# Patient Record
Sex: FEMALE | Race: WHITE | NOT HISPANIC OR LATINO | URBAN - METROPOLITAN AREA
[De-identification: names, ages, dates, MRNs, and addresses within clinical notes are randomized per-mention and may not be internally consistent; named-entity substitution may affect disease eponyms.]

---

## 2019-05-28 ENCOUNTER — HOSPITAL ENCOUNTER (OUTPATIENT)
Facility: HOSPITAL | Age: 24
Setting detail: HOSPITAL OUTPATIENT SURGERY
End: 2019-05-28
Attending: OBSTETRICS & GYNECOLOGY | Admitting: OBSTETRICS & GYNECOLOGY

## 2019-05-29 ENCOUNTER — HOSPITAL ENCOUNTER (OUTPATIENT)
Facility: HOSPITAL | Age: 24
Setting detail: HOSPITAL OUTPATIENT SURGERY
Discharge: HOME | End: 2019-05-29
Attending: OBSTETRICS & GYNECOLOGY | Admitting: OBSTETRICS & GYNECOLOGY

## 2019-05-29 ENCOUNTER — ANESTHESIA (OUTPATIENT)
Dept: PERIOP | Facility: HOSPITAL | Age: 24
End: 2019-05-29

## 2019-05-29 ENCOUNTER — ANESTHESIA EVENT (OUTPATIENT)
Dept: PERIOP | Facility: HOSPITAL | Age: 24
End: 2019-05-29

## 2019-05-29 VITALS
OXYGEN SATURATION: 96 % | SYSTOLIC BLOOD PRESSURE: 122 MMHG | DIASTOLIC BLOOD PRESSURE: 77 MMHG | HEART RATE: 90 BPM | RESPIRATION RATE: 16 BRPM | TEMPERATURE: 97.8 F

## 2019-05-29 DIAGNOSIS — N75.0 BARTHOLIN CYST: ICD-10-CM

## 2019-05-29 PROCEDURE — 25010000002 MIDAZOLAM PER 1 MG: Performed by: ANESTHESIOLOGY

## 2019-05-29 PROCEDURE — 25010000002 ONDANSETRON PER 1 MG: Performed by: NURSE ANESTHETIST, CERTIFIED REGISTERED

## 2019-05-29 PROCEDURE — 25010000002 FENTANYL CITRATE (PF) 100 MCG/2ML SOLUTION: Performed by: NURSE ANESTHETIST, CERTIFIED REGISTERED

## 2019-05-29 PROCEDURE — 25010000002 MIDAZOLAM PER 1 MG: Performed by: NURSE ANESTHETIST, CERTIFIED REGISTERED

## 2019-05-29 PROCEDURE — 25010000002 ROPIVACAINE PER 1 MG: Performed by: OBSTETRICS & GYNECOLOGY

## 2019-05-29 PROCEDURE — 25010000002 PROPOFOL 10 MG/ML EMULSION: Performed by: NURSE ANESTHETIST, CERTIFIED REGISTERED

## 2019-05-29 PROCEDURE — 88304 TISSUE EXAM BY PATHOLOGIST: CPT | Performed by: OBSTETRICS & GYNECOLOGY

## 2019-05-29 PROCEDURE — 25010000002 FENTANYL CITRATE (PF) 100 MCG/2ML SOLUTION: Performed by: ANESTHESIOLOGY

## 2019-05-29 PROCEDURE — 25010000002 DEXAMETHASONE PER 1 MG: Performed by: NURSE ANESTHETIST, CERTIFIED REGISTERED

## 2019-05-29 PROCEDURE — 25010000002 KETOROLAC TROMETHAMINE PER 15 MG: Performed by: NURSE ANESTHETIST, CERTIFIED REGISTERED

## 2019-05-29 RX ORDER — PROMETHAZINE HYDROCHLORIDE 25 MG/ML
6.25 INJECTION, SOLUTION INTRAMUSCULAR; INTRAVENOUS
Status: DISCONTINUED | OUTPATIENT
Start: 2019-05-29 | End: 2019-05-29 | Stop reason: HOSPADM

## 2019-05-29 RX ORDER — HYDRALAZINE HYDROCHLORIDE 20 MG/ML
5 INJECTION INTRAMUSCULAR; INTRAVENOUS
Status: DISCONTINUED | OUTPATIENT
Start: 2019-05-29 | End: 2019-05-29 | Stop reason: HOSPADM

## 2019-05-29 RX ORDER — EPHEDRINE SULFATE 50 MG/ML
5 INJECTION, SOLUTION INTRAVENOUS ONCE AS NEEDED
Status: DISCONTINUED | OUTPATIENT
Start: 2019-05-29 | End: 2019-05-29 | Stop reason: HOSPADM

## 2019-05-29 RX ORDER — LIDOCAINE HYDROCHLORIDE 20 MG/ML
INJECTION, SOLUTION INFILTRATION; PERINEURAL AS NEEDED
Status: DISCONTINUED | OUTPATIENT
Start: 2019-05-29 | End: 2019-05-29 | Stop reason: SURG

## 2019-05-29 RX ORDER — HYDROMORPHONE HYDROCHLORIDE 1 MG/ML
0.5 INJECTION, SOLUTION INTRAMUSCULAR; INTRAVENOUS; SUBCUTANEOUS
Status: DISCONTINUED | OUTPATIENT
Start: 2019-05-29 | End: 2019-05-29 | Stop reason: HOSPADM

## 2019-05-29 RX ORDER — MIDAZOLAM HYDROCHLORIDE 1 MG/ML
1 INJECTION INTRAMUSCULAR; INTRAVENOUS
Status: DISCONTINUED | OUTPATIENT
Start: 2019-05-29 | End: 2019-05-29 | Stop reason: HOSPADM

## 2019-05-29 RX ORDER — DIPHENHYDRAMINE HCL 25 MG
25 CAPSULE ORAL
Status: DISCONTINUED | OUTPATIENT
Start: 2019-05-29 | End: 2019-05-29 | Stop reason: HOSPADM

## 2019-05-29 RX ORDER — FENTANYL CITRATE 50 UG/ML
50 INJECTION, SOLUTION INTRAMUSCULAR; INTRAVENOUS
Status: DISCONTINUED | OUTPATIENT
Start: 2019-05-29 | End: 2019-05-29 | Stop reason: HOSPADM

## 2019-05-29 RX ORDER — LABETALOL HYDROCHLORIDE 5 MG/ML
5 INJECTION, SOLUTION INTRAVENOUS
Status: DISCONTINUED | OUTPATIENT
Start: 2019-05-29 | End: 2019-05-29 | Stop reason: HOSPADM

## 2019-05-29 RX ORDER — MIDAZOLAM HYDROCHLORIDE 1 MG/ML
2 INJECTION INTRAMUSCULAR; INTRAVENOUS
Status: DISCONTINUED | OUTPATIENT
Start: 2019-05-29 | End: 2019-05-29 | Stop reason: HOSPADM

## 2019-05-29 RX ORDER — CEPHALEXIN 500 MG/1
500 CAPSULE ORAL 3 TIMES DAILY
COMMUNITY

## 2019-05-29 RX ORDER — FAMOTIDINE 10 MG/ML
20 INJECTION, SOLUTION INTRAVENOUS ONCE
Status: COMPLETED | OUTPATIENT
Start: 2019-05-29 | End: 2019-05-29

## 2019-05-29 RX ORDER — HYDROCODONE BITARTRATE AND ACETAMINOPHEN 7.5; 325 MG/1; MG/1
1 TABLET ORAL ONCE AS NEEDED
Status: DISCONTINUED | OUTPATIENT
Start: 2019-05-29 | End: 2019-05-29 | Stop reason: HOSPADM

## 2019-05-29 RX ORDER — DEXAMETHASONE SODIUM PHOSPHATE 10 MG/ML
INJECTION INTRAMUSCULAR; INTRAVENOUS AS NEEDED
Status: DISCONTINUED | OUTPATIENT
Start: 2019-05-29 | End: 2019-05-29 | Stop reason: SURG

## 2019-05-29 RX ORDER — OXYCODONE AND ACETAMINOPHEN 7.5; 325 MG/1; MG/1
1 TABLET ORAL ONCE AS NEEDED
Status: COMPLETED | OUTPATIENT
Start: 2019-05-29 | End: 2019-05-29

## 2019-05-29 RX ORDER — NALOXONE HCL 0.4 MG/ML
0.2 VIAL (ML) INJECTION AS NEEDED
Status: DISCONTINUED | OUTPATIENT
Start: 2019-05-29 | End: 2019-05-29 | Stop reason: HOSPADM

## 2019-05-29 RX ORDER — PROMETHAZINE HYDROCHLORIDE 25 MG/ML
12.5 INJECTION, SOLUTION INTRAMUSCULAR; INTRAVENOUS ONCE AS NEEDED
Status: DISCONTINUED | OUTPATIENT
Start: 2019-05-29 | End: 2019-05-29 | Stop reason: HOSPADM

## 2019-05-29 RX ORDER — LIDOCAINE HYDROCHLORIDE 10 MG/ML
0.5 INJECTION, SOLUTION EPIDURAL; INFILTRATION; INTRACAUDAL; PERINEURAL ONCE AS NEEDED
Status: DISCONTINUED | OUTPATIENT
Start: 2019-05-29 | End: 2019-05-29 | Stop reason: HOSPADM

## 2019-05-29 RX ORDER — FENTANYL CITRATE 50 UG/ML
INJECTION, SOLUTION INTRAMUSCULAR; INTRAVENOUS AS NEEDED
Status: DISCONTINUED | OUTPATIENT
Start: 2019-05-29 | End: 2019-05-29 | Stop reason: SURG

## 2019-05-29 RX ORDER — SODIUM CHLORIDE, SODIUM LACTATE, POTASSIUM CHLORIDE, CALCIUM CHLORIDE 600; 310; 30; 20 MG/100ML; MG/100ML; MG/100ML; MG/100ML
9 INJECTION, SOLUTION INTRAVENOUS CONTINUOUS
Status: DISCONTINUED | OUTPATIENT
Start: 2019-05-29 | End: 2019-05-29 | Stop reason: HOSPADM

## 2019-05-29 RX ORDER — PROMETHAZINE HYDROCHLORIDE 25 MG/1
25 SUPPOSITORY RECTAL ONCE AS NEEDED
Status: DISCONTINUED | OUTPATIENT
Start: 2019-05-29 | End: 2019-05-29 | Stop reason: HOSPADM

## 2019-05-29 RX ORDER — MIDAZOLAM HYDROCHLORIDE 1 MG/ML
INJECTION INTRAMUSCULAR; INTRAVENOUS AS NEEDED
Status: DISCONTINUED | OUTPATIENT
Start: 2019-05-29 | End: 2019-05-29 | Stop reason: SURG

## 2019-05-29 RX ORDER — DIPHENHYDRAMINE HYDROCHLORIDE 50 MG/ML
12.5 INJECTION INTRAMUSCULAR; INTRAVENOUS
Status: DISCONTINUED | OUTPATIENT
Start: 2019-05-29 | End: 2019-05-29 | Stop reason: HOSPADM

## 2019-05-29 RX ORDER — FLUMAZENIL 0.1 MG/ML
0.2 INJECTION INTRAVENOUS AS NEEDED
Status: DISCONTINUED | OUTPATIENT
Start: 2019-05-29 | End: 2019-05-29 | Stop reason: HOSPADM

## 2019-05-29 RX ORDER — ONDANSETRON 2 MG/ML
INJECTION INTRAMUSCULAR; INTRAVENOUS AS NEEDED
Status: DISCONTINUED | OUTPATIENT
Start: 2019-05-29 | End: 2019-05-29 | Stop reason: SURG

## 2019-05-29 RX ORDER — PROPOFOL 10 MG/ML
VIAL (ML) INTRAVENOUS AS NEEDED
Status: DISCONTINUED | OUTPATIENT
Start: 2019-05-29 | End: 2019-05-29 | Stop reason: SURG

## 2019-05-29 RX ORDER — MAGNESIUM HYDROXIDE 1200 MG/15ML
LIQUID ORAL AS NEEDED
Status: DISCONTINUED | OUTPATIENT
Start: 2019-05-29 | End: 2019-05-29 | Stop reason: HOSPADM

## 2019-05-29 RX ORDER — ONDANSETRON 2 MG/ML
4 INJECTION INTRAMUSCULAR; INTRAVENOUS ONCE AS NEEDED
Status: DISCONTINUED | OUTPATIENT
Start: 2019-05-29 | End: 2019-05-29 | Stop reason: HOSPADM

## 2019-05-29 RX ORDER — KETOROLAC TROMETHAMINE 30 MG/ML
INJECTION, SOLUTION INTRAMUSCULAR; INTRAVENOUS AS NEEDED
Status: DISCONTINUED | OUTPATIENT
Start: 2019-05-29 | End: 2019-05-29 | Stop reason: SURG

## 2019-05-29 RX ORDER — SODIUM CHLORIDE 0.9 % (FLUSH) 0.9 %
1-10 SYRINGE (ML) INJECTION AS NEEDED
Status: DISCONTINUED | OUTPATIENT
Start: 2019-05-29 | End: 2019-05-29 | Stop reason: HOSPADM

## 2019-05-29 RX ORDER — ACETAMINOPHEN 325 MG/1
650 TABLET ORAL ONCE AS NEEDED
Status: DISCONTINUED | OUTPATIENT
Start: 2019-05-29 | End: 2019-05-29 | Stop reason: HOSPADM

## 2019-05-29 RX ORDER — PROMETHAZINE HYDROCHLORIDE 25 MG/1
25 TABLET ORAL ONCE AS NEEDED
Status: DISCONTINUED | OUTPATIENT
Start: 2019-05-29 | End: 2019-05-29 | Stop reason: HOSPADM

## 2019-05-29 RX ADMIN — KETOROLAC TROMETHAMINE 30 MG: 30 INJECTION, SOLUTION INTRAMUSCULAR; INTRAVENOUS at 17:20

## 2019-05-29 RX ADMIN — ONDANSETRON 4 MG: 2 INJECTION INTRAMUSCULAR; INTRAVENOUS at 17:16

## 2019-05-29 RX ADMIN — OXYCODONE HYDROCHLORIDE AND ACETAMINOPHEN 1 TABLET: 7.5; 325 TABLET ORAL at 18:27

## 2019-05-29 RX ADMIN — SODIUM CHLORIDE, POTASSIUM CHLORIDE, SODIUM LACTATE AND CALCIUM CHLORIDE: 600; 310; 30; 20 INJECTION, SOLUTION INTRAVENOUS at 17:04

## 2019-05-29 RX ADMIN — Medication 2 MG: at 17:05

## 2019-05-29 RX ADMIN — DEXAMETHASONE SODIUM PHOSPHATE 8 MG: 10 INJECTION INTRAMUSCULAR; INTRAVENOUS at 17:16

## 2019-05-29 RX ADMIN — FAMOTIDINE 20 MG: 10 INJECTION INTRAVENOUS at 16:58

## 2019-05-29 RX ADMIN — PROPOFOL 150 MG: 10 INJECTION, EMULSION INTRAVENOUS at 17:10

## 2019-05-29 RX ADMIN — FENTANYL CITRATE 50 MCG: 50 INJECTION INTRAMUSCULAR; INTRAVENOUS at 17:18

## 2019-05-29 RX ADMIN — FENTANYL CITRATE 50 MCG: 50 INJECTION INTRAMUSCULAR; INTRAVENOUS at 16:58

## 2019-05-29 RX ADMIN — FENTANYL CITRATE 50 MCG: 50 INJECTION INTRAMUSCULAR; INTRAVENOUS at 17:33

## 2019-05-29 RX ADMIN — FENTANYL CITRATE 100 MCG: 50 INJECTION INTRAMUSCULAR; INTRAVENOUS at 17:08

## 2019-05-29 RX ADMIN — SODIUM CHLORIDE, POTASSIUM CHLORIDE, SODIUM LACTATE AND CALCIUM CHLORIDE 9 ML/HR: 600; 310; 30; 20 INJECTION, SOLUTION INTRAVENOUS at 17:00

## 2019-05-29 RX ADMIN — MIDAZOLAM 1 MG: 1 INJECTION INTRAMUSCULAR; INTRAVENOUS at 16:59

## 2019-05-29 RX ADMIN — LIDOCAINE HYDROCHLORIDE 100 MG: 20 INJECTION, SOLUTION INFILTRATION; PERINEURAL at 17:10

## 2019-05-29 NOTE — ANESTHESIA PREPROCEDURE EVALUATION
Anesthesia Evaluation     Patient summary reviewed and Nursing notes reviewed   no history of anesthetic complications:  NPO Solid Status: > 8 hours  NPO Liquid Status: > 2 hours           Airway   Mallampati: I  TM distance: >3 FB  Neck ROM: full  no difficulty expected  Dental - normal exam     Pulmonary - normal exam   (+) a smoker Current Smoked day of surgery,   (-) COPD, asthma, shortness of breath, lung cancer  Cardiovascular - negative cardio ROS and normal exam  Exercise tolerance: excellent (>7 METS)    Rhythm: regular  Rate: normal    (-) hypertension, valvular problems/murmurs, past MI, CAD, dysrhythmias, angina, CHF, cardiac stents      Neuro/Psych- negative ROS  (-) seizures, TIA, CVA  GI/Hepatic/Renal/Endo - negative ROS   (-) hiatal hernia, GERD, PUD, hepatitis, liver disease, no renal disease, diabetes, hypothyroidism, GI bleed    Musculoskeletal (-) negative ROS    Abdominal  - normal exam   Substance History - negative use     OB/GYN negative ob/gyn ROS         Other - negative ROS                     Anesthesia Plan    ASA 1     general     intravenous induction   Anesthetic plan, all risks, benefits, and alternatives have been provided, discussed and informed consent has been obtained with: patient.    Plan discussed with CRNA and attending.

## 2019-05-29 NOTE — ANESTHESIA POSTPROCEDURE EVALUATION
Patient: Megha Alcantara    Procedure Summary     Date:  05/29/19 Room / Location:  [facility] OR  / [facility] OR    Anesthesia Start:  1704 Anesthesia Stop:  1748    Procedure:  BARTHOLIN CYST MARSUPIALIZATION (N/A Vulva) Diagnosis:      Surgeon:  Edwige Mace MD Provider:  Huang Molina MD    Anesthesia Type:  general ASA Status:  1          Anesthesia Type: general  Last vitals  BP   123/78 (05/29/19 1845)   Temp   36.6 °C (97.8 °F) (05/29/19 1745)   Pulse   82 (05/29/19 1845)   Resp   16 (05/29/19 1845)     SpO2   95 % (05/29/19 1845)     Post Anesthesia Care and Evaluation    Patient location during evaluation: PHASE II  Patient participation: complete - patient participated  Level of consciousness: awake and alert  Pain score: 0  Pain management: adequate  Airway patency: patent  Anesthetic complications: No anesthetic complications    Cardiovascular status: acceptable  Respiratory status: acceptable  Hydration status: acceptable    Comments: /78   Pulse 82   Temp 36.6 °C (97.8 °F) (Oral)   Resp 16   SpO2 95%

## 2019-05-31 LAB
CYTO UR: NORMAL
LAB AP CASE REPORT: NORMAL
PATH REPORT.FINAL DX SPEC: NORMAL
PATH REPORT.GROSS SPEC: NORMAL

## 2023-12-21 ENCOUNTER — HOSPITAL ENCOUNTER (OUTPATIENT)
Facility: HOSPITAL | Age: 28
Discharge: HOME | End: 2023-12-21
Attending: EMERGENCY MEDICINE
Payer: COMMERCIAL

## 2023-12-21 VITALS
BODY MASS INDEX: 22.15 KG/M2 | HEIGHT: 63 IN | OXYGEN SATURATION: 98 % | WEIGHT: 125 LBS | RESPIRATION RATE: 16 BRPM | TEMPERATURE: 98.5 F | DIASTOLIC BLOOD PRESSURE: 101 MMHG | HEART RATE: 80 BPM | SYSTOLIC BLOOD PRESSURE: 149 MMHG

## 2023-12-21 DIAGNOSIS — H66.90 ACUTE OTITIS MEDIA, UNSPECIFIED OTITIS MEDIA TYPE: Primary | ICD-10-CM

## 2023-12-21 PROCEDURE — 99203 OFFICE O/P NEW LOW 30 MIN: CPT | Performed by: PHYSICIAN ASSISTANT

## 2023-12-21 PROCEDURE — EDLOS: Performed by: PHYSICIAN ASSISTANT

## 2023-12-21 PROCEDURE — G0463 HOSPITAL OUTPT CLINIC VISIT: HCPCS | Performed by: PHYSICIAN ASSISTANT

## 2023-12-21 RX ORDER — AMOXICILLIN AND CLAVULANATE POTASSIUM 875; 125 MG/1; MG/1
1 TABLET, FILM COATED ORAL 2 TIMES DAILY
Qty: 20 TABLET | Refills: 0 | Status: SHIPPED | OUTPATIENT
Start: 2023-12-21 | End: 2023-12-31

## 2023-12-21 RX ORDER — AMOXICILLIN AND CLAVULANATE POTASSIUM 875; 125 MG/1; MG/1
1 TABLET, FILM COATED ORAL ONCE
Status: COMPLETED | OUTPATIENT
Start: 2023-12-21 | End: 2023-12-21

## 2023-12-21 RX ORDER — FLUTICASONE PROPIONATE 50 MCG
2 SPRAY, SUSPENSION (ML) NASAL DAILY
Qty: 9.9 ML | Refills: 0 | Status: SHIPPED | OUTPATIENT
Start: 2023-12-21

## 2023-12-21 RX ADMIN — AMOXICILLIN AND CLAVULANATE POTASSIUM 1 TABLET: 875; 125 TABLET, FILM COATED ORAL at 18:40

## 2023-12-22 NOTE — FSED PROVIDER NOTE
Subjective   History of Present Illness  Patient is 28-year-old female presents emergency room with some right ear pain for few days and today she cannot hear out of it.  She is not having any other concerns or symptoms such as sore throat or congestion or fever.      Review of Systems   Constitutional:  Negative for fever.   HENT:  Positive for ear pain and hearing loss. Negative for ear discharge.    All other systems reviewed and are negative.      History reviewed. No pertinent past medical history.    No Known Allergies    Past Surgical History:   Procedure Laterality Date    BARTHOLIN CYST MARSUPIALIZATION N/A 5/29/2019    Procedure: BARTHOLIN CYST MARSUPIALIZATION;  Surgeon: Edwige Mace MD;  Location: [facility];  Service: Obstetrics/Gynecology       History reviewed. No pertinent family history.    Social History     Socioeconomic History    Marital status: Single   Tobacco Use    Smoking status: Every Day    Smokeless tobacco: Never   Substance and Sexual Activity    Alcohol use: Yes           Objective   Physical Exam  Vitals reviewed.   Constitutional:       Appearance: Normal appearance.   HENT:      Head: Normocephalic.      Left Ear: Tympanic membrane normal.      Ears:      Comments: Left TM red and bulging     Mouth/Throat:      Mouth: Mucous membranes are moist.      Pharynx: Oropharynx is clear. No oropharyngeal exudate or posterior oropharyngeal erythema.   Cardiovascular:      Rate and Rhythm: Normal rate.   Pulmonary:      Effort: Pulmonary effort is normal.   Musculoskeletal:         General: Normal range of motion.      Cervical back: Normal range of motion.   Skin:     General: Skin is warm and dry.   Neurological:      General: No focal deficit present.      Mental Status: She is alert.   Psychiatric:         Mood and Affect: Mood normal.         Behavior: Behavior normal.         Procedures           ED Course                                           Medical Decision Making  At  presentation patient is well-appearing and nontoxic.  She does have a bulging right TM that is red and has a fluid behind it.  We will treat her with Augmentin.  No swelling noted.  She does have some tenderness in the general area but no bogginess on the mastoid process.  No drainage.  Other exam was normal.  Return to emergency room as needed    Problems Addressed:  Acute otitis media, unspecified otitis media type: complicated acute illness or injury    Risk  Prescription drug management.        Final diagnoses:   Acute otitis media, unspecified otitis media type       ED Disposition  ED Disposition       ED Disposition   Discharge    Condition   Stable    Comment   --               [facility] - [place] [address]  [phone]  Call   As needed         Medication List        New Prescriptions      amoxicillin-clavulanate 875-125 MG per tablet  Commonly known as: AUGMENTIN  Take 1 tablet by mouth 2 (Two) Times a Day for 10 days.     fluticasone 50 MCG/ACT nasal spray  Commonly known as: FLONASE  2 sprays into the nostril(s) as directed by provider Daily.               Where to Get Your Medications        These medications were sent to [facility]/pharmacy #[id number] - [place], [place] - [address] AT [facility] - [phone] [facility] [phone]   [address], [facility] [address]      Phone: [phone]   amoxicillin-clavulanate 875-125 MG per tablet  fluticasone 50 MCG/ACT nasal spray

## 2024-06-19 ENCOUNTER — HOSPITAL ENCOUNTER (EMERGENCY)
Facility: HOSPITAL | Age: 29
Discharge: HOME | End: 2024-06-19
Attending: EMERGENCY MEDICINE
Payer: OTHER MISCELLANEOUS

## 2024-06-19 ENCOUNTER — APPOINTMENT (OUTPATIENT)
Dept: GENERAL RADIOLOGY | Facility: HOSPITAL | Age: 29
End: 2024-06-19
Payer: OTHER MISCELLANEOUS

## 2024-06-19 VITALS
SYSTOLIC BLOOD PRESSURE: 121 MMHG | HEIGHT: 63 IN | BODY MASS INDEX: 25.34 KG/M2 | TEMPERATURE: 97.9 F | HEART RATE: 76 BPM | RESPIRATION RATE: 16 BRPM | WEIGHT: 143 LBS | OXYGEN SATURATION: 98 % | DIASTOLIC BLOOD PRESSURE: 86 MMHG

## 2024-06-19 DIAGNOSIS — S63.501A SPRAIN OF RIGHT WRIST, INITIAL ENCOUNTER: Primary | ICD-10-CM

## 2024-06-19 PROCEDURE — 99283 EMERGENCY DEPT VISIT LOW MDM: CPT

## 2024-06-19 PROCEDURE — 73110 X-RAY EXAM OF WRIST: CPT

## 2024-06-19 RX ORDER — METHOCARBAMOL 750 MG/1
750 TABLET, FILM COATED ORAL 3 TIMES DAILY
Qty: 21 TABLET | Refills: 0 | Status: SHIPPED | OUTPATIENT
Start: 2024-06-19 | End: 2024-06-26

## 2024-06-19 RX ORDER — IBUPROFEN 600 MG/1
600 TABLET ORAL ONCE
Status: DISCONTINUED | OUTPATIENT
Start: 2024-06-19 | End: 2024-06-19 | Stop reason: HOSPADM

## 2024-06-19 RX ORDER — IBUPROFEN 600 MG/1
600 TABLET ORAL EVERY 8 HOURS PRN
Qty: 21 TABLET | Refills: 0 | Status: SHIPPED | OUTPATIENT
Start: 2024-06-19

## 2024-06-19 NOTE — Clinical Note
[facility] EMERGENCY DEPARTMENT  [address]  [facility] [address]  Phone: [phone]    Megha Alcantara was seen and treated in our emergency department on 6/19/2024.  She may return to work on 06/21/2024.         Thank you for choosing [facility].    Zion Dimas MD

## 2024-06-19 NOTE — Clinical Note
[facility] EMERGENCY DEPARTMENT  [address]  [facility] [address]  Phone: [phone]    Megha Alcantara was seen and treated in our emergency department on 6/19/2024.  She may return to work on 06/19/2024.         Thank you for choosing [facility].    Zion Dimas MD

## 2024-06-19 NOTE — Clinical Note
[facility] EMERGENCY DEPARTMENT  [address]  [facility] [address]  Phone: [phone]    Megha Alcantara was seen and treated in our emergency department on 6/19/2024.  She may return to work on 06/21/2024.         Thank you for choosing [facility].    Zion Dimas MD

## 2024-06-19 NOTE — Clinical Note
[facility] EMERGENCY DEPARTMENT  [address]  [facility] [address]  Phone: [phone]    Megha Alcantara was seen and treated in our emergency department on 6/19/2024.  She may return to work on 06/21/2024.         Thank you for choosing [facility].    Zion Dimas MD

## 2024-06-19 NOTE — DISCHARGE INSTRUCTIONS
Follow-up with your staff physician or orthopedics.  Take medication as prescribed.  Use ice for inflammation.  Return for worsening symptoms

## 2024-06-19 NOTE — ED PROVIDER NOTES
Subjective   History of Present Illness  Is a 28-year-old female presented emergency department some right wrist pain.  The patient was at work when she was lifting a box.  It bent her wrist back.  She felt something pop in her right wrist.  She is having some significant pain in her right wrist since then.  Is global in nature.  Worse when she tries to move it.  She not taken thing for pain prior to arrival.  No other injuries.  No focal weakness.    History provided by:  Patient   used: No        Review of Systems   Constitutional: Negative.    Cardiovascular: Negative.    Musculoskeletal:  Positive for arthralgias and myalgias.   Neurological: Negative.        No past medical history on file.    No Known Allergies    Past Surgical History:   Procedure Laterality Date    BARTHOLIN CYST MARSUPIALIZATION N/A 5/29/2019    Procedure: BARTHOLIN CYST MARSUPIALIZATION;  Surgeon: Edwige Mace MD;  Location: [facility]  Service: Obstetrics/Gynecology       No family history on file.    Social History     Socioeconomic History    Marital status: Single   Tobacco Use    Smoking status: Every Day    Smokeless tobacco: Never   Substance and Sexual Activity    Alcohol use: Yes           Objective   Physical Exam  Vitals and nursing note reviewed.   Constitutional:       General: She is not in acute distress.     Appearance: She is not ill-appearing or toxic-appearing.   Cardiovascular:      Pulses: Normal pulses.   Musculoskeletal:      Comments: Patient has some mild tenderness palpation diffusely of right wrist.  No obvious deformity.  Range of motion slightly limited secondary to pain.  Compartment soft.  Neurovascular intact   Neurological:      General: No focal deficit present.      Mental Status: She is alert.         Procedures           ED Course  ED Course as of 06/19/24 0122 Wed Jun 19, 2024   0121 BP(!): 155/113 [JK]   0121 Temp: 97.9 °F (36.6 °C) [JK]   0121 Heart Rate: 71 [JK]   0121  Resp: 18 [JK]   0121 SpO2: 95 %  Interpretation:  Patient's repeat vitals, telemetry tracing, and pulse oximetry tracing were directly viewed and interpreted by myself.  Normal sinus rhythm [JK]   0121 XR Wrist 3+ View Right  Interpretation:  Imaging was directly visualized by myself, per my interpretations, x-ray of the right wrist did not show any acute fracture. [JK]   0121 On reevaluation, the patient's pain is improved.  Range of motion appears intact.  Repeat examination does not show any evidence of compartment syndrome or neurovascular compromise.  Patient is given care instructions for the injury.  Extremities to be propped up and elevated.  They are to ice injury for 15 minutes, to avoid any frostbite.   [JK]   0121 I had a discussion with the patient/family regarding diagnosis, diagnostic results, treatment plan, and medications. The patient/family indicated understanding of these instructions. I spent adequate time at the bedside prior to discharge necessary to discuss the aftercare instructions, giving patient education, providing explanations of the results of our evaluations/findings, and my decision making to assure that the patient/family understand the plan of care. Time was allotted to answer questions at that time and throughout the ED course. Patient is required to maintain timely follow up, as discussed. I also discussed the potential for the development of an acute emergent condition requiring further evaluation, return to the ER, admission, or even surgical intervention.  I encouraged the patient to return to the emergency department immediately for any concerns, worsening symptoms, new complaints, or if symptoms persist and they are unable to seek follow-up in a timely fashion. The patient/family expressed understanding and agreement with this plan    Shared decision making:   After full review of the patient's clinical presentation, review of any work-up including but not limited to  laboratory studies and radiology obtained, I had a discussion with the patient.  Treatment options were discussed as well as the risks, benefits and consequences.  I discussed all findings with the patient and family members if available.  During the discussion, treatment goals were understood by all as well as any misconceptions which were addressed with the patient.  Ample time was given for any questions they may have had.  They are in agreement with the treatment plan as well as final disposition. [JK]      ED Course User Index  [JK] Zion Dimas MD                                             Medical Decision Making  This is a 28-year-old female presented emergency department with right wrist pain.  Patient had a bending injury while at work.  Findings are concerning for fracture versus sprain.  There is no neurovascular compromise or evidence of compartment syndrome.  Patient provided analgesics.  Workup initiated.      Differential diagnosis: Right wrist sprain, strain, contusion, fracture    Amount and/or Complexity of Data Reviewed  External Data Reviewed: notes.     Details: External laboratories, imaging as well as notes were reviewed personally by myself.  All relevant studies were used to guide decision making.     Date of previous record: 3/14/2024    Source of note: ER record    Summary: Patient was seen at outlying facility for otitis.  Records reviewed    Radiology: ordered and independent interpretation performed. Decision-making details documented in ED Course.    Risk  Prescription drug management.        Final diagnoses:   Sprain of right wrist, initial encounter       ED Disposition  ED Disposition       ED Disposition   Discharge    Condition   Stable    Comment   --               Brodie Zapien MD  [address]  [facility] [address]  [facility] [address] [phone]    Call in 1 day           Medication List        New Prescriptions      ibuprofen 600 MG tablet  Commonly known as:  ADVIL,MOTRIN  Take 1 tablet by mouth Every 8 (Eight) Hours As Needed for Mild Pain (for moderate severity pain).     methocarbamol 750 MG tablet  Commonly known as: ROBAXIN  Take 1 tablet by mouth 3 (Three) Times a Day for 7 days.               Where to Get Your Medications        These medications were sent to [id number] - [place], [place] - [address] AT [facility] [address] - [phone]  - [phone]   [address] [address]      Phone: [phone]   ibuprofen 600 MG tablet  methocarbamol 750 MG tablet            Zion Dimas MD  06/19/24 0122

## 2024-06-19 NOTE — Clinical Note
[facility] EMERGENCY DEPARTMENT  [address]  [facility] [address]  Phone: [phone]    Megha Alcantara was seen and treated in our emergency department on 6/19/2024.  She may return to work on 06/19/2024.         Thank you for choosing [facility].    Zion Dimas MD

## 2024-09-27 ENCOUNTER — HOSPITAL ENCOUNTER (EMERGENCY)
Facility: HOSPITAL | Age: 29
Discharge: HOME | End: 2024-09-27
Attending: EMERGENCY MEDICINE
Payer: COMMERCIAL

## 2024-09-27 ENCOUNTER — APPOINTMENT (OUTPATIENT)
Dept: CT IMAGING | Facility: HOSPITAL | Age: 29
End: 2024-09-27
Payer: COMMERCIAL

## 2024-09-27 VITALS
BODY MASS INDEX: 22.15 KG/M2 | TEMPERATURE: 97.5 F | RESPIRATION RATE: 18 BRPM | SYSTOLIC BLOOD PRESSURE: 137 MMHG | HEART RATE: 106 BPM | DIASTOLIC BLOOD PRESSURE: 93 MMHG | WEIGHT: 125 LBS | OXYGEN SATURATION: 98 % | HEIGHT: 63 IN

## 2024-09-27 DIAGNOSIS — R11.10 VOMITING AND DIARRHEA: Primary | ICD-10-CM

## 2024-09-27 DIAGNOSIS — R19.7 VOMITING AND DIARRHEA: Primary | ICD-10-CM

## 2024-09-27 LAB
ALBUMIN SERPL-MCNC: 5.3 G/DL (ref 3.5–5.2)
ALBUMIN/GLOB SERPL: 1.8 G/DL
ALP SERPL-CCNC: 81 U/L (ref 39–117)
ALT SERPL W P-5'-P-CCNC: 14 U/L (ref 1–33)
ANION GAP SERPL CALCULATED.3IONS-SCNC: 14 MMOL/L (ref 5–15)
AST SERPL-CCNC: 17 U/L (ref 1–32)
BASOPHILS # BLD AUTO: 0.01 10*3/MM3 (ref 0–0.2)
BASOPHILS NFR BLD AUTO: 0 % (ref 0–1.5)
BILIRUB SERPL-MCNC: 2 MG/DL (ref 0–1.2)
BILIRUB UR QL STRIP: NEGATIVE
BUN SERPL-MCNC: 14 MG/DL (ref 6–20)
BUN/CREAT SERPL: 17.5 (ref 7–25)
CALCIUM SPEC-SCNC: 10.1 MG/DL (ref 8.6–10.5)
CHLORIDE SERPL-SCNC: 103 MMOL/L (ref 98–107)
CLARITY UR: CLEAR
CO2 SERPL-SCNC: 20 MMOL/L (ref 22–29)
COLOR UR: YELLOW
CREAT SERPL-MCNC: 0.8 MG/DL (ref 0.57–1)
DEPRECATED RDW RBC AUTO: 43.5 FL (ref 37–54)
EGFRCR SERPLBLD CKD-EPI 2021: 103.1 ML/MIN/1.73
EOSINOPHIL # BLD AUTO: 0.01 10*3/MM3 (ref 0–0.4)
EOSINOPHIL NFR BLD AUTO: 0 % (ref 0.3–6.2)
ERYTHROCYTE [DISTWIDTH] IN BLOOD BY AUTOMATED COUNT: 11.7 % (ref 12.3–15.4)
GLOBULIN UR ELPH-MCNC: 3 GM/DL
GLUCOSE SERPL-MCNC: 138 MG/DL (ref 65–99)
GLUCOSE UR STRIP-MCNC: NEGATIVE MG/DL
HCG SERPL QL: NEGATIVE
HCT VFR BLD AUTO: 46.3 % (ref 34–46.6)
HGB BLD-MCNC: 16.4 G/DL (ref 12–15.9)
HGB UR QL STRIP.AUTO: ABNORMAL
IMM GRANULOCYTES # BLD AUTO: 0.05 10*3/MM3 (ref 0–0.05)
IMM GRANULOCYTES NFR BLD AUTO: 0.2 % (ref 0–0.5)
KETONES UR QL STRIP: ABNORMAL
LEUKOCYTE ESTERASE UR QL STRIP.AUTO: NEGATIVE
LIPASE SERPL-CCNC: 16 U/L (ref 13–60)
LYMPHOCYTES # BLD AUTO: 0.32 10*3/MM3 (ref 0.7–3.1)
LYMPHOCYTES NFR BLD AUTO: 1.6 % (ref 19.6–45.3)
MCH RBC QN AUTO: 35 PG (ref 26.6–33)
MCHC RBC AUTO-ENTMCNC: 35.4 G/DL (ref 31.5–35.7)
MCV RBC AUTO: 98.7 FL (ref 79–97)
MONOCYTES # BLD AUTO: 0.72 10*3/MM3 (ref 0.1–0.9)
MONOCYTES NFR BLD AUTO: 3.6 % (ref 5–12)
NEUTROPHILS NFR BLD AUTO: 18.91 10*3/MM3 (ref 1.7–7)
NEUTROPHILS NFR BLD AUTO: 94.6 % (ref 42.7–76)
NITRITE UR QL STRIP: NEGATIVE
PH UR STRIP.AUTO: <=5 [PH] (ref 5–8)
PLATELET # BLD AUTO: 293 10*3/MM3 (ref 140–450)
PMV BLD AUTO: 10.8 FL (ref 6–12)
POTASSIUM SERPL-SCNC: 4.2 MMOL/L (ref 3.5–5.2)
PROT SERPL-MCNC: 8.3 G/DL (ref 6–8.5)
PROT UR QL STRIP: NEGATIVE
RBC # BLD AUTO: 4.69 10*6/MM3 (ref 3.77–5.28)
SODIUM SERPL-SCNC: 137 MMOL/L (ref 136–145)
SP GR UR STRIP: <=1.005 (ref 1–1.03)
UROBILINOGEN UR QL STRIP: ABNORMAL
WBC NRBC COR # BLD AUTO: 20.02 10*3/MM3 (ref 3.4–10.8)

## 2024-09-27 PROCEDURE — 74177 CT ABD & PELVIS W/CONTRAST: CPT

## 2024-09-27 PROCEDURE — 99284 EMERGENCY DEPT VISIT MOD MDM: CPT | Performed by: PHYSICIAN ASSISTANT

## 2024-09-27 PROCEDURE — 96375 TX/PRO/DX INJ NEW DRUG ADDON: CPT

## 2024-09-27 PROCEDURE — 83690 ASSAY OF LIPASE: CPT | Performed by: PHYSICIAN ASSISTANT

## 2024-09-27 PROCEDURE — 96374 THER/PROPH/DIAG INJ IV PUSH: CPT

## 2024-09-27 PROCEDURE — 25010000002 ONDANSETRON PER 1 MG: Performed by: PHYSICIAN ASSISTANT

## 2024-09-27 PROCEDURE — 81003 URINALYSIS AUTO W/O SCOPE: CPT | Performed by: PHYSICIAN ASSISTANT

## 2024-09-27 PROCEDURE — 85025 COMPLETE CBC W/AUTO DIFF WBC: CPT | Performed by: PHYSICIAN ASSISTANT

## 2024-09-27 PROCEDURE — 25510000001 IOPAMIDOL PER 1 ML: Performed by: EMERGENCY MEDICINE

## 2024-09-27 PROCEDURE — 99285 EMERGENCY DEPT VISIT HI MDM: CPT

## 2024-09-27 PROCEDURE — 80053 COMPREHEN METABOLIC PANEL: CPT | Performed by: PHYSICIAN ASSISTANT

## 2024-09-27 PROCEDURE — 25010000002 ONDANSETRON PER 1 MG: Performed by: EMERGENCY MEDICINE

## 2024-09-27 PROCEDURE — 25810000003 SODIUM CHLORIDE 0.9 % SOLUTION: Performed by: PHYSICIAN ASSISTANT

## 2024-09-27 PROCEDURE — 84703 CHORIONIC GONADOTROPIN ASSAY: CPT | Performed by: PHYSICIAN ASSISTANT

## 2024-09-27 RX ORDER — ONDANSETRON 2 MG/ML
4 INJECTION INTRAMUSCULAR; INTRAVENOUS ONCE
Status: COMPLETED | OUTPATIENT
Start: 2024-09-27 | End: 2024-09-27

## 2024-09-27 RX ORDER — ONDANSETRON 4 MG/1
4 TABLET, ORALLY DISINTEGRATING ORAL EVERY 6 HOURS PRN
Qty: 20 TABLET | Refills: 0 | Status: SHIPPED | OUTPATIENT
Start: 2024-09-27

## 2024-09-27 RX ORDER — DIPHENOXYLATE HCL/ATROPINE 2.5-.025MG
2 TABLET ORAL 4 TIMES DAILY PRN
Qty: 15 TABLET | Refills: 0 | Status: SHIPPED | OUTPATIENT
Start: 2024-09-27 | End: 2024-09-29

## 2024-09-27 RX ORDER — IOPAMIDOL 755 MG/ML
100 INJECTION, SOLUTION INTRAVASCULAR
Status: COMPLETED | OUTPATIENT
Start: 2024-09-27 | End: 2024-09-27

## 2024-09-27 RX ORDER — DIPHENOXYLATE HCL/ATROPINE 2.5-.025MG
2 TABLET ORAL ONCE
Status: COMPLETED | OUTPATIENT
Start: 2024-09-27 | End: 2024-09-27

## 2024-09-27 RX ADMIN — DIPHENOXYLATE HYDROCHLORIDE AND ATROPINE SULFATE 2 TABLET: 2.5; .025 TABLET ORAL at 22:45

## 2024-09-27 RX ADMIN — IOPAMIDOL 85 ML: 755 INJECTION, SOLUTION INTRAVENOUS at 21:47

## 2024-09-27 RX ADMIN — ONDANSETRON 4 MG: 2 INJECTION, SOLUTION INTRAMUSCULAR; INTRAVENOUS at 22:52

## 2024-09-27 RX ADMIN — SODIUM CHLORIDE 1000 ML: 9 INJECTION, SOLUTION INTRAVENOUS at 21:00

## 2024-09-27 RX ADMIN — ONDANSETRON 4 MG: 2 INJECTION, SOLUTION INTRAMUSCULAR; INTRAVENOUS at 21:00

## 2024-09-27 NOTE — Clinical Note
[facility]  [address]  [facility] [address]    Megha Alcantara was seen and treated in our emergency department on 9/27/2024.  She may return to work on 10/01/2024.         Thank you for choosing [facility].    Monty Altman MD

## 2024-09-28 NOTE — FSED PROVIDER NOTE
EMERGENCY DEPARTMENT ENCOUNTER    Room Number:  05/05  Date seen:  9/28/2024  Time seen: 19:26 EDT  PCP: Provider, No Known  Historian: Patient    Discussed/obtained information from independent historians: N/A    HPI:  Chief complaint: Nausea vomiting diarrhea abdominal pain  A complete HPI/ROS/PMH/PSH/SH/FH are unobtainable due to: Nothing  Context:Megha Alcantara is a 28 y.o. female who presents to the ED with c/o nausea vomiting diarrhea abdominal pain.  Patient reports she has 2 children that have had similar symptoms.  She states her children seem to be significantly improving and are almost back to baseline but she continues to get worse.  She reports that sharp cramping abdominal pain that radiates throughout the entire abdomen.  No precipitating or alleviating factors.  Vomitus is said to be nonbloody nonbilious.  Diarrhea is said to be nonbloody and very loose/watery in consistency.  Patient denies eating anything out of the ordinary or any recent travel out of the country.  No chest pain, palpitations, shortness of breath, near syncope.  Patient states she does feel very dry.  She reports chills but no fever.  She also reports a mild headache.  No neck pain or photophobia.  Patient is here for further evaluation.          Chronic or social conditions impacting care:    ALLERGIES  Patient has no known allergies.    PAST MEDICAL HISTORY  Active Ambulatory Problems     Diagnosis Date Noted    No Active Ambulatory Problems     Resolved Ambulatory Problems     Diagnosis Date Noted    No Resolved Ambulatory Problems     No Additional Past Medical History       PAST SURGICAL HISTORY  Past Surgical History:   Procedure Laterality Date    BARTHOLIN CYST MARSUPIALIZATION N/A 5/29/2019    Procedure: BARTHOLIN CYST MARSUPIALIZATION;  Surgeon: Edwige Mace MD;  Location: [facility];  Service: Obstetrics/Gynecology       FAMILY HISTORY  History reviewed. No pertinent family history.    SOCIAL  HISTORY  Social History     Socioeconomic History    Marital status: Single   Tobacco Use    Smoking status: Every Day    Smokeless tobacco: Never   Substance and Sexual Activity    Alcohol use: Yes       REVIEW OF SYSTEMS  Review of Systems    All systems reviewed and negative except for those discussed in HPI.     PHYSICAL EXAM    I have reviewed the triage vital signs and nursing notes.  Vitals:    09/27/24 2038   BP: 137/93   Pulse: 106   Resp: 18   Temp: 97.5 °F (36.4 °C)   SpO2: 98%     Physical Exam    GENERAL: Nontoxic female, not distressed  HENT: nares patent, dry mucous membranes  EYES: no scleral icterus  NECK: no ROM limitations  CV: regular rhythm, regular rate  RESPIRATORY: normal effort, lungs CTAB   ABDOMEN: soft, nontender  : deferred  MUSCULOSKELETAL: no deformity  NEURO: alert, moves all extremities, follows commands  SKIN: warm, dry    LAB RESULTS  Recent Results (from the past 24 hour(s))   Comprehensive Metabolic Panel    Collection Time: 09/27/24  8:59 PM    Specimen: Blood   Result Value Ref Range    Glucose 138 (H) 65 - 99 mg/dL    BUN 14 6 - 20 mg/dL    Creatinine 0.80 0.57 - 1.00 mg/dL    Sodium 137 136 - 145 mmol/L    Potassium 4.2 3.5 - 5.2 mmol/L    Chloride 103 98 - 107 mmol/L    CO2 20.0 (L) 22.0 - 29.0 mmol/L    Calcium 10.1 8.6 - 10.5 mg/dL    Total Protein 8.3 6.0 - 8.5 g/dL    Albumin 5.3 (H) 3.5 - 5.2 g/dL    ALT (SGPT) 14 1 - 33 U/L    AST (SGOT) 17 1 - 32 U/L    Alkaline Phosphatase 81 39 - 117 U/L    Total Bilirubin 2.0 (H) 0.0 - 1.2 mg/dL    Globulin 3.0 gm/dL    A/G Ratio 1.8 g/dL    BUN/Creatinine Ratio 17.5 7.0 - 25.0    Anion Gap 14.0 5.0 - 15.0 mmol/L    eGFR 103.1 >60.0 mL/min/1.73   Lipase    Collection Time: 09/27/24  8:59 PM    Specimen: Blood   Result Value Ref Range    Lipase 16 13 - 60 U/L   hCG, Serum, Qualitative    Collection Time: 09/27/24  8:59 PM    Specimen: Blood   Result Value Ref Range    HCG Qualitative Negative Negative   CBC Auto Differential     Collection Time: 09/27/24  8:59 PM    Specimen: Blood   Result Value Ref Range    WBC 20.02 (H) 3.40 - 10.80 10*3/mm3    RBC 4.69 3.77 - 5.28 10*6/mm3    Hemoglobin 16.4 (H) 12.0 - 15.9 g/dL    Hematocrit 46.3 34.0 - 46.6 %    MCV 98.7 (H) 79.0 - 97.0 fL    MCH 35.0 (H) 26.6 - 33.0 pg    MCHC 35.4 31.5 - 35.7 g/dL    RDW 11.7 (L) 12.3 - 15.4 %    RDW-SD 43.5 37.0 - 54.0 fl    MPV 10.8 6.0 - 12.0 fL    Platelets 293 140 - 450 10*3/mm3    Neutrophil % 94.6 (H) 42.7 - 76.0 %    Lymphocyte % 1.6 (L) 19.6 - 45.3 %    Monocyte % 3.6 (L) 5.0 - 12.0 %    Eosinophil % 0.0 (L) 0.3 - 6.2 %    Basophil % 0.0 0.0 - 1.5 %    Immature Grans % 0.2 0.0 - 0.5 %    Neutrophils, Absolute 18.91 (H) 1.70 - 7.00 10*3/mm3    Lymphocytes, Absolute 0.32 (L) 0.70 - 3.10 10*3/mm3    Monocytes, Absolute 0.72 0.10 - 0.90 10*3/mm3    Eosinophils, Absolute 0.01 0.00 - 0.40 10*3/mm3    Basophils, Absolute 0.01 0.00 - 0.20 10*3/mm3    Immature Grans, Absolute 0.05 0.00 - 0.05 10*3/mm3   Urinalysis without microscopic (no culture) - Urine, Clean Catch    Collection Time: 09/27/24 10:16 PM    Specimen: Urine, Clean Catch   Result Value Ref Range    Color, UA Yellow Yellow, Straw    Appearance, UA Clear Clear    pH, UA <=5.0 5.0 - 8.0    Specific Gravity, UA <=1.005 1.005 - 1.030    Glucose, UA Negative Negative    Ketones, UA 40 mg/dL (2+) (A) Negative    Bilirubin, UA Negative Negative    Blood, UA Trace (A) Negative    Protein, UA Negative Negative    Leuk Esterase, UA Negative Negative    Nitrite, UA Negative Negative    Urobilinogen, UA 0.2 E.U./dL 0.2 - 1.0 E.U./dL       Ordered the above labs and independently interpreted results.  My findings will be discussed in the ED course or medical decision making section below    RADIOLOGY RESULTS  CT Abdomen Pelvis With Contrast    Result Date: 9/27/2024  CT OF THE AND PELVIS WITH CONTRAST  HISTORY: Vomiting and diarrhea  COMPARISON: None available.  TECHNIQUE: Axial CT imaging was obtained through the  abdomen and pelvis. IV contrast was administered.  FINDINGS: Images through the lung bases are clear. No suspicious hepatic lesions are seen. The stomach, duodenum, adrenal glands, spleen, and pancreas are all normal. There is cholelithiasis. The kidneys enhance symmetrically. No hydronephrosis is seen. There is a 3 mm nonobstructing stone within the right kidney. There is potentially a 2 mm nonobstructing stone within the left kidney. Uterus appears normal. There is a hemorrhagic left ovarian cyst. This measures 1.9 cm. There is liquid stool noted throughout the colon, in keeping with history of diarrhea. The appendix is normal. Patient is noted to have some patulous loops of small bowel, particularly within the lower abdomen. Appearance suggests enterocolitis. No acute osseous abnormalities are seen. There is some trace free fluid within the pelvis.      Liquid stool noted throughout the colon, as well as multiple patulous loops of small bowel. Appearance suggests enterocolitis.  Radiation dose reduction techniques were utilized, including automated exposure control and exposure modulation based on body size.   This report was finalized on 9/27/2024 10:16 PM by Dr. Merry Phillips M.D on Workstation: [id number]        Ordered the above noted radiological studies.  Independently interpreted by me.  My findings will be discussed in the medical decision section below.     PROGRESS, DATA ANALYSIS, CONSULTS AND MEDICAL DECISION MAKING    Please note that this section constitutes my independent interpretation of clinical data including lab results, radiology, EKG's.  This constitutes my independent professional opinion regarding differential diagnosis and management of this patient.  It may include any factors such as history from outside sources, review of external records, social determinants of health, management of medications, response to those treatments, and discussions with other providers.    ED Course as  of 09/28/24 1933   Fri Sep 27, 2024   2105 Lipase: 16 [RC]   2115 WBC(!): 20.02  Noted.  Given patient's pain level will go ahead and order CT scan abdomen pelvis with IV contrast to further evaluate. [RC]   2222 Ketones, UA(!): 40 mg/dL (2+)  Patient is clearly dry beginning IV fluids currently.  She states she started to feel much better. [RC]   2232 Patient turned over to Dr Altman.  CT A/P pending. [RC]   2255 Patient is much improved at this time. [TC]      ED Course User Index  [RC] Jasper Krishnamurthy III, PA  [TC] Monty Altman MD     Orders placed during this visit:  Orders Placed This Encounter   Procedures    CT Abdomen Pelvis With Contrast    Comprehensive Metabolic Panel    Lipase    hCG, Serum, Qualitative    Urinalysis without microscopic (no culture) - Urine, Clean Catch    CBC Auto Differential    CBC & Differential    ED Acknowledgement Form Needed;            Medical Decision Making  Problems Addressed:  Vomiting and diarrhea: complicated acute illness or injury    Amount and/or Complexity of Data Reviewed  Labs: ordered.  Radiology: ordered.    Risk  Prescription drug management.      DDx: Viral GE, food poisoning, acute pancreatitis, gastritis, duodenitis, acute cholecystitis, colitis, pregnancy.  Will initiate workup by obtaining CBC, CMP, lipase, UA.  Will go ahead and administer IV fluids and Zofran as the patient does appear dry.  Monitor closely.      DIAGNOSIS  Final diagnoses:   Vomiting and diarrhea          Medication List        New Prescriptions      diphenoxylate-atropine 2.5-0.025 MG per tablet  Commonly known as: LOMOTIL  Take 2 tablets by mouth 4 (Four) Times a Day As Needed for Diarrhea for up to 2 days.     ondansetron ODT 4 MG disintegrating tablet  Commonly known as: ZOFRAN-ODT  Place 1 tablet on the tongue Every 6 (Six) Hours As Needed for Vomiting or Nausea.               Where to Get Your Medications        These medications were sent to [facility]/pharmacy #[id number] -  [place], [place] - [address] AT COR[facility] OF [facility] - [phone]  - [phone]   [address] [facility] [address]      Phone: [phone]   diphenoxylate-atropine 2.5-0.025 MG per tablet  ondansetron ODT 4 MG disintegrating tablet         FOLLOW-UP  Provider, No Known  [facility]  [place] [id number]    In 1 week          Latest Documented Vital Signs:  As of 19:33 EDT  BP- 137/93 HR- 106 Temp- 97.5 °F (36.4 °C) (Oral) O2 sat- 98%    Appropriate PPE utilized throughout this patient encounter to include mask, if indicated, per current protocol. Hand hygiene was performed before donning PPE and after removal when leaving the room.    Please note that portions of this were completed with a voice recognition program.     Note Disclaimer: At [facility], we believe that sharing information builds trust and better relationships. You are receiving this note because you are receiving care at [facility] or recently visited. It is possible you will see health information before a provider has talked with you about it. This kind of information can be easy to misunderstand. To help you fully understand what it means for your health, we urge you to discuss this note with your provider.

## 2024-09-28 NOTE — DISCHARGE INSTRUCTIONS
Today the CAT scan reveals what appears to be a viral type gastroenteritis.  No evidence of appendicitis or obstruction    Please look over the dietary instructions on the attached instruction list.    I sent in sublingual Zofran for nausea as well as a medication called Lomotil for your abdominal cramping and diarrhea.  Take as directed    I did provide you with a work note for 3 days if you do need this.    Return to ER for increasing pain, inability tolerate fluids, other difficulties    Please read all of the instructions in this handout.  If you receive prescriptions please fill them and take them as directed.  Please call your primary care physician for follow-up appointment in the next 5 to 7 days.  If you do not have a physician you may call the [facility] referral line at [phone].    You may return to the emergency department at any time for any concerns such as worsening symptoms.  If you received a work or school note it will be printed at the back of this packet.

## 2024-11-27 ENCOUNTER — OFFICE VISIT (OUTPATIENT)
Dept: ORTHOPEDIC SURGERY | Facility: CLINIC | Age: 29
End: 2024-11-27
Payer: OTHER MISCELLANEOUS

## 2024-11-27 ENCOUNTER — TELEPHONE (OUTPATIENT)
Dept: ORTHOPEDIC SURGERY | Facility: CLINIC | Age: 29
End: 2024-11-27
Payer: COMMERCIAL

## 2024-11-27 VITALS
WEIGHT: 119.4 LBS | HEIGHT: 63 IN | SYSTOLIC BLOOD PRESSURE: 134 MMHG | BODY MASS INDEX: 21.16 KG/M2 | DIASTOLIC BLOOD PRESSURE: 80 MMHG

## 2024-11-27 DIAGNOSIS — M25.531 RIGHT WRIST PAIN: Primary | ICD-10-CM

## 2024-11-27 RX ORDER — DEXTROAMPHETAMINE SACCHARATE, AMPHETAMINE ASPARTATE, DEXTROAMPHETAMINE SULFATE AND AMPHETAMINE SULFATE 5; 5; 5; 5 MG/1; MG/1; MG/1; MG/1
TABLET ORAL
COMMUNITY
Start: 2024-11-01

## 2024-11-27 NOTE — TELEPHONE ENCOUNTER
Caller: [employer]    Relationship: [employer]    Best call back number: [phone]    What form or medical record are you requesting:  OFFICE NOTES AND WORK STATUS 11/27/24    Who is requesting this form or medical record from you: [employer]    How would you like to receive the form or medical records (pick-up, mail, fax): FAX  If fax, what is the fax number: [phone]

## 2024-11-27 NOTE — PROGRESS NOTES
[facility]     Office Visit       Date: 11/27/2024   Patient Name: Megha Alcantara  MRN: [id number]  YOB: 1995    Referring Physician: Kirk Boothe Jr., *     Chief Complaint:   Chief Complaint   Patient presents with    Right Wrist - Pain, Initial Evaluation     DOI 06/19/2024        History of Present Illness:   Megha Alcantara is a 29 y.o. female right-hand-dominant presents with right wrist pain of 5 months duration.  Patient reports that she was lifting an 80 pound painters bucket at work and felt a pop in her wrist.  She had immediate pain swelling and bruising of her right wrist.  She reports that she has had persistent radial wrist pain since then.  She reports the pain is worse with use and activity.  She has tried bracing physical therapy and anti-inflammatories with minimal improvement her pain.  She is otherwise healthy.  She works at [employer] but was recently laid off due to her limitations.  She denies smoking.      Subjective   Review of Systems:   Review of Systems   Constitutional:  Negative for chills, fever, unexpected weight gain and unexpected weight loss.   HENT:  Negative for congestion, postnasal drip and rhinorrhea.    Eyes:  Negative for blurred vision.   Respiratory:  Negative for shortness of breath.    Cardiovascular:  Negative for leg swelling.   Gastrointestinal:  Negative for abdominal pain, nausea and vomiting.   Genitourinary:  Negative for difficulty urinating.   Musculoskeletal:  Positive for arthralgias. Negative for gait problem, joint swelling and myalgias.   Skin:  Negative for skin lesions and wound.   Neurological:  Negative for dizziness, weakness, light-headedness and numbness.   Hematological:  Does not bruise/bleed easily.   Psychiatric/Behavioral:  Negative for depressed mood.         Pertinent review of systems per HPI.     I reviewed the patient's chief complaint,  "history of present illness, review of systems, past medical history, surgical history, family history, social history, medications and allergy list in the EMR on 11/27/2024 and agree with the findings above.    Objective    Vital Signs:   Vitals:    11/27/24 1201   BP: 134/80   Weight: 54.2 kg (119 lb 6.4 oz)   Height: 160 cm (62.99\")     BMI: Body mass index is 21.16 kg/m².    General Appearance: No acute distress. Alert and oriented.     Chest:  Non-labored breathing on room air. Regular rate and rhythm.    Upper Extremity Exam:    Tender palpation over the radial styloid as well as the radial scaphoid joint particularly over the volar radial scaphoid joint.  Positive Finkelstein's test.  Negative Leon shift test.  Nontender throughout the remainder of the wrist.  Full range of motion.  DRUJ stable in pronation and supination.    Fingers are warm, well-perfused with appropriate capillary refill.  Palpable radial pulse.    Sensation intact to light touch in median, radial and ulnar nerve distributions.    Motor- [employer]ires [employer], ulnar intrinsics, EPL/EDC w/ full active and passive range of motion. Strength intact.    Non-tender except for in the areas highlighted    Imaging/Studies:   Imaging Results (Last 24 Hours)       Procedure Component Value Units Date/Time    XR Wrist 3+ View Right [[id number]] Resulted: 11/27/24 1227     Updated: 11/27/24 1227    Narrative:      Right Wrist X-Ray    Indication: Pain    Views:  AP, Lateral, and Oblique     Comparison:  6/19/24    Findings:  No fracture  No bony lesion  Normal soft tissues  Normal joint spaces    Impression:   No acute bony abnormality right wrist            MRI of the right wrist from outside source was independently reviewed and interpreted myself and demonstrates evidence of increased signal at the radial scaphocapitate ligament on the right with an associated ganglion concerning for possible radial scaphoid capitate ligament " injury    Procedures:  Procedures    Quality Measures:   ACP:   ACP discussion was deferred.    Tobacco:   Megha Alcantara  reports that she has never smoked. She has never used smokeless tobacco.      Assessment / Plan    Assessment/Plan:     There are no diagnoses linked to this encounter.     Megha Alcantarais a 29 y.o. female who presents with:      ICD-10-CM ICD-9-CM   1. Right wrist pain  M25.531 719.43         Patient presents with right radial wrist pain after sustaining a work related injury over 5 months ago.  She has had persistent right radial wrist pain despite physical therapy bracing and anti-inflammatories.  She has evidence of de Quervain's tenosynovitis on exam as well as radioscaphocapitate ligament injury on MRI and exam.    Discussed treatment options including corticosteroid injection versus wrist arthroscopy, de Quervain's release and possible ligament repair.  Patient has failed nonoperative therapy up to this point and would like to have the issue addressed completely.  Given these concerns recommend operative treatment with right de Quervain's release, right wrist arthroscopy and possible open radioscaphocapitate ligament repair.  Discussed that postoperatively the patient will require immobilization physical therapy.  She understands this and like to proceed with surgery.    Consent-right wrist de Quervain's release, right wrist arthroscopy and possible open right radioscaphocapitate ligament repair    The risks and benefits of the procedure were discussed with the patient and or appropriate guardian, which include but are not limited to the risk of bleeding, infection, neurovascular damage, post-operative stiffness, recurrence, tendon and/or ligament retears, recurrent instability, continued pain, arthritic pain, need for further revision surgeries in the future, deep venous thrombosis, and general risks from anesthesia. We also discussed the post-operative rehabilitation, the need  for physical therapy, and the overall expected outcomes from the procedure. We also discussed the possible use of biologics including allograft. We allowed proper time and answered the patient's questions regarding the procedure. The patient expressed understanding. Knowing what the risks are and what the conservative treatment is, the patient elected to forgo any further conservative treatment options and proceed with the surgical intervention.      Follow Up:   Return in about 6 weeks (around 1/8/2025).        Daniel Gupta MD  [facility] Upper Extremity Surgeon

## 2024-12-02 ENCOUNTER — TELEPHONE (OUTPATIENT)
Dept: ORTHOPEDIC SURGERY | Facility: CLINIC | Age: 29
End: 2024-12-02
Payer: COMMERCIAL

## 2024-12-02 NOTE — TELEPHONE ENCOUNTER
Caller: NATHANAEL    Relationship: NURSE  WITH [employer]    Best call back number:     What form or medical record are you requesting: WORK STATUS AND SURGICAL ORDER    Who is requesting this form or medical record from you: [employer]    How would you like to receive the form or medical records (pick-up, mail, fax): FAX  If fax, what is the fax number:     Timeframe paperwork needed: ASAP

## 2024-12-10 NOTE — TELEPHONE ENCOUNTER
I called to speak to Ofe, She stated that the office notes were already sent to her, she needs the request for surgery. I got the fax number from her and faxed the request for surgery.

## 2024-12-10 NOTE — TELEPHONE ENCOUNTER
Provider: DARÍO    Caller: RON JOSEPH/ OLIVIA ALVARADO [employer]    Relationship to Patient:     Phone Number: [phone]    Reason for Call: RON CALLING AGAIN TO CHECK ON STATUS OF PAPERWORK REQUEST. SHE STATES SHE NEEDS THE ORDER FOR SURGERY SO SHE CAN SUBMIT IT FOR REVIEW.

## 2024-12-11 ENCOUNTER — TELEPHONE (OUTPATIENT)
Dept: ORTHOPEDIC SURGERY | Facility: CLINIC | Age: 29
End: 2024-12-11

## 2024-12-11 ENCOUNTER — TELEPHONE (OUTPATIENT)
Dept: ORTHOPEDIC SURGERY | Facility: CLINIC | Age: 29
End: 2024-12-11
Payer: COMMERCIAL

## 2024-12-11 RX ORDER — CELECOXIB 100 MG/1
100 CAPSULE ORAL 2 TIMES DAILY
Qty: 60 CAPSULE | Refills: 0 | Status: SHIPPED | OUTPATIENT
Start: 2024-12-11 | End: 2024-12-16 | Stop reason: SDUPTHER

## 2024-12-11 NOTE — TELEPHONE ENCOUNTER
I contacted the patient and let her know that Dr. Gupta sent in a prescription for Celebrex to her pharmacy. She verbalized good understanding.

## 2024-12-11 NOTE — TELEPHONE ENCOUNTER
[facility]    Received a call from Grace CATES At [facility], in  regards to a workers comp peer to peer request for surgery on the patients right wrist. Per Grace, they are only requesting a call back at: [phone] for Dr. Fatuma Zavala, whom will be the reviewing physician. Please advise.     Thank you kindly!

## 2024-12-11 NOTE — TELEPHONE ENCOUNTER
Patient states that she woke up yesterday and her thumb and her palm hurt a lot. States that it is a deep ache and there is a sharp pain when she moves it. She states that she did not injury it but its been like that. She knows that she is planning on having surgery but since she is workers comp so it is taking awhile. She states that it could possibly be inflammation but would like something called in to help with this. Patient would like a call from clinic to discuss and also would like something called into the [facility] pharmacy on [address].

## 2024-12-12 ENCOUNTER — TELEPHONE (OUTPATIENT)
Dept: ORTHOPEDIC SURGERY | Facility: CLINIC | Age: 29
End: 2024-12-12
Payer: COMMERCIAL

## 2024-12-12 NOTE — TELEPHONE ENCOUNTER
Dr. Gupta tried to call and that number is unable to take calls. I called Hanna back at [phone] to attempt to get the correct number. She did not answer I left a voicemail asking her to give me a call back. Iesha Noel CMA

## 2024-12-12 NOTE — TELEPHONE ENCOUNTER
DARÍO     Received a call from Hanna FIELD at [facility] (Dr. Ra Zavala - [phone], in respect to requesting a [id number] for a surgical procedure (right wrist dequerdainf release). Please advise.     Thank you kindly!

## 2024-12-13 NOTE — TELEPHONE ENCOUNTER
Patient is requesting medication be sent to [facility] on [address]. She states that she keeps getting calls from a [facility] in [place] to  the medication. Can someone check on this please?     Call back # [phone]

## 2024-12-16 ENCOUNTER — TELEPHONE (OUTPATIENT)
Dept: ORTHOPEDIC SURGERY | Facility: CLINIC | Age: 29
End: 2024-12-16
Payer: COMMERCIAL

## 2024-12-16 RX ORDER — CELECOXIB 100 MG/1
100 CAPSULE ORAL 2 TIMES DAILY
Qty: 60 CAPSULE | Refills: 0 | Status: CANCELLED | OUTPATIENT
Start: 2024-12-16

## 2024-12-16 RX ORDER — CELECOXIB 100 MG/1
100 CAPSULE ORAL 2 TIMES DAILY
Qty: 60 CAPSULE | Refills: 0 | Status: SHIPPED | OUTPATIENT
Start: 2024-12-16

## 2024-12-16 NOTE — TELEPHONE ENCOUNTER
Patient called to get a refill on her Celebrex. She said the last prescription was sent to a [facility] in [place] and she needs it sent to her local [facility] in [place].         Where:[facility].

## 2024-12-16 NOTE — TELEPHONE ENCOUNTER
Pt requesting the celebrex be sent to the [facility] the [facility] in [place]. Can you cancel the one sent to [facility] and send it in to the [facility] instead? Thank you.  Ramya Nuñez CMA ([facility])

## 2024-12-16 NOTE — TELEPHONE ENCOUNTER
LVM that celebrex was sent in to correct pharmacy. She can call us back with any further questions at [phone].      Ramya Nuñez CMA ([facility])

## 2024-12-16 NOTE — TELEPHONE ENCOUNTER
Tried to call Hanna back again today, to get correct phone number for peer to peer for Dr. Gupta. I did not get an answer and left another voicemail asking her to give me a call back. Iesha Noel CMA

## 2025-02-18 ENCOUNTER — TELEPHONE (OUTPATIENT)
Dept: ORTHOPEDIC SURGERY | Facility: CLINIC | Age: 30
End: 2025-02-18

## 2025-02-18 ENCOUNTER — OUTSIDE FACILITY SERVICE (OUTPATIENT)
Dept: ORTHOPEDIC SURGERY | Facility: CLINIC | Age: 30
End: 2025-02-18
Payer: COMMERCIAL

## 2025-02-18 ENCOUNTER — DOCUMENTATION (OUTPATIENT)
Age: 30
End: 2025-02-18
Payer: COMMERCIAL

## 2025-02-18 DIAGNOSIS — Z98.890 POST-OPERATIVE STATE: Primary | ICD-10-CM

## 2025-02-18 RX ORDER — IBUPROFEN 600 MG/1
600 TABLET, FILM COATED ORAL EVERY 8 HOURS PRN
Qty: 21 TABLET | Refills: 0 | Status: SHIPPED | OUTPATIENT
Start: 2025-02-18

## 2025-02-18 RX ORDER — ACETAMINOPHEN 325 MG/1
650 TABLET ORAL EVERY 6 HOURS PRN
Qty: 100 TABLET | Refills: 1 | Status: SHIPPED | OUTPATIENT
Start: 2025-02-18

## 2025-02-18 RX ORDER — OXYCODONE HYDROCHLORIDE 5 MG/1
5 TABLET ORAL EVERY 6 HOURS PRN
Qty: 16 TABLET | Refills: 0 | Status: SHIPPED | OUTPATIENT
Start: 2025-02-18

## 2025-02-18 NOTE — PROGRESS NOTES
DATE OF PROCEDURE: 02/18/2025    LOCATION: [facility]      PROCEDURES PERFORMED:    Right wrist arthroscopy w/ partial synovectomy CPT 58692  Right wrist dequervains release CPT 69399    SURGEON: Daniel Gupta MD      ASSISTANTS:    1. None        * Surgery not found *      ANESTHESIA: General w/ block    PREOPERATIVE DIAGNOSES:  Right wrist de Quervain's tenosynovitis  Right radial wrist pain     POSTOPERATIVE DIAGNOSES:    Right wrist de Quervain's tenosynovitis  Intact radioscaphocapitate ligament   Radiocarpal synovitis     ESTIMATED BLOOD LOSS: 1 mL.    SPECIMENS: none    IMPLANTS: none    COMPLICATIONS: None     INDICATIONS:  Megha Alcantara is a 29 y.o. female who initially presented with right de Quervain's tenosynovitis as well as radial sided wrist pain with evidence of a volar radiocarpal ganglion cyst on exam and possible.  The risks, benefits, alternatives and potential complications of surgery were discussed with the patient including but not limited to bleeding scarring, infection, recurrence, stiffness, damage to surrounding structures or postoperative pain.  The patient understands the risks and agreed to proceed with surgery.  A surgical informed consent was signed prior to the procedure.     DESCRIPTION OF PROCEDURE:      The patient was greeted in the pre-operative holding area and the surgical site was marked and consent confirmed prior to bringing the patient to the operating room.  Patient received a right upper extremity block by anesthesia in the preoperative holding area.  The patient was then taken to the operating room and a timeout was performed including the patient's name, procedure and antibiotic administration prior to the patient receiving general anesthesia.  The patient was positioned supine on the operative room table and a non-sterile tourniquet was applied to the right upper extremity and it was then prepped and draped in the usual sterile  fashion.  The patient received the appropriate antibiotics within 1 hour of skin incision.     The right upper extremity was exsanguinated and the tourniquet set to 250 mmHg.      De Quervain's release was performed first.  A 3 cm incision was made over the radial styloid line of the first extensor compartment.  Blunt dissection was used to carry the incision through subcutaneous tissue.  Branches of the radial sensory nerve were identified and protected.  The first dorsal extensor compartments identified and then the sheath incised under direct visualization.  The release was completed proximally and distally.  There is an additional extensor pollicis brevis subs sheath that was incised and resected.  He confirmed complete release of both the first dorsal extensor compartment and the associated subs sheath.  I then proceeded with the wrist arthroscopy.    Finger traps were applied to the middle and ring fingers and the patient was hung in the wrist arthroscopy tower with approximately 15 pounds of traction.  Leeann's tubercle was palpated and a 3 4 portal was marked just distal to Leeann's and the soft spot of the radiocarpal joint.  A 4 5 working portal was also marked between the fourth and fifth dorsal compartments. A 22-gauge needle was used to enter the radiocarpal joint through the 3-4 portal and then the joint flushed with 5 cc of normal saline solution.  After removal of the syringe there was backflow from the needle.  Next a longitudinal stab incision was made using a 15 blade and hemostat used to carry the incision down to the wrist capsule.  The hemostat was then used to bluntly penetrate the dorsal capsule and spread to allow passage of the trocar.  The arthroscopy trocar was then passed through 3-4 portal followed by the wrist arthroscope.     Next the dorsal wrist was directly viewed as manual palpation was pressed against the 4 5 portal marking.  Again a 22-gauge needle was used to enter the wrist  under direct visualization.  Next a longitudinal stab incision was made and hemostat passed through the wrist capsule at the location of the 4-5 portal which was then spread to allow opening of the portal.  Wrist arthroscopy was performed using gravity irrigation of normal saline.    We then proceeded with a diagnostic arthroscopy.      Exam of the structures from radial to ulnar.  There is synovitis in the radiocarpal joint.  There is no evidence of arthritis of the radial scaphoid radiolunate or ulnocarpal joints.  There was synovitis in the ulnocarpal joint.  The radioscaphocapitate, long and short radiolunate ligaments were visualized and intact.  There is attenuation of the scapholunate ligament but it is grossly intact.  There is no instability between the scaphoid and the lunate.  There was some synovitis associated with the TFCC however this is grossly intact.  The LT ligament was intact.    Arthroscopic shaver was then used to debride the wrist of any radial and ulnar synovitis as well as dorsal synovitis.  We performed this until both the radiocarpal and ulnocarpal joints appear clean without evidence of remaining synovitis.    This completed the wrist arthroscopy.    The 3 4 and 4 5 portals were then closed with 4-0 nylon in simple interrupted fashion.  The de Quervain's incision was closed with 4-0 nylon in mattress fashion.  A soft dressing was applied     At the end of the procedure the patient was awoken from anesthesia and transferred to the PACU in stable condition.  I        POSTOPERATIVE PLAN:  Dressing down in 7 days.  Therapy to start 5 to 7 days for gentle range of motion of the wrist and thumb.  Tylenol ibuprofen and oxycodone for pain  Right upper extremity ice and elevation.

## 2025-02-18 NOTE — TELEPHONE ENCOUNTER
Provider: DR. CHANEL    Caller: DAVON HERNÁNDEZ    Relationship to Patient: OLIVIA ALVARADO    Phone Number: [phone]    Reason for Call: DAVON HERNÁNDEZ CALLED WANTING TO KNOW IF ANY DME ORDERS HAVE BEEN PUT IN. WOULD LIKE THE ORDERS TO BE FAXED TO HER IF POSSIBLE. PLEASE ADVISE.    FAX: [phone]

## 2025-02-19 ENCOUNTER — TELEPHONE (OUTPATIENT)
Dept: ORTHOPEDIC SURGERY | Facility: CLINIC | Age: 30
End: 2025-02-19
Payer: COMMERCIAL

## 2025-02-19 NOTE — TELEPHONE ENCOUNTER
Caller: CECILIA PATEL     Phone Number: [phone] (home)     Reason for Call:   PATIENT IS REQUESTING A NOTE FOR WORK FOR HER SELF AND HER BOYFRIEND WHO BROUGHT HER TO HER SURGERY ON YESTERDAY 2-18-25  BOYFRIENDS NAME IS ISABEL FAY TO UPLOAD INTO [employer].

## 2025-02-20 ENCOUNTER — TELEPHONE (OUTPATIENT)
Dept: ORTHOPEDIC SURGERY | Facility: CLINIC | Age: 30
End: 2025-02-20

## 2025-02-20 NOTE — TELEPHONE ENCOUNTER
[facility] received a fax that requires your attention. The document has been indexed to the patient’s chart for your review.      Reason for sending: RECEIVED RECORDS REQUEST FROM [employer]ANCE    Documents Description: RECORDS REQUEST-[employer]-2/18/2025    Name of Sender: NICOLE ALEJOORD    Date Indexed: 2/20/2025

## 2025-03-05 ENCOUNTER — OFFICE VISIT (OUTPATIENT)
Dept: ORTHOPEDIC SURGERY | Facility: CLINIC | Age: 30
End: 2025-03-05
Payer: COMMERCIAL

## 2025-03-05 DIAGNOSIS — Z98.890 POST-OPERATIVE STATE: Primary | ICD-10-CM

## 2025-03-05 PROCEDURE — 1159F MED LIST DOCD IN RCRD: CPT | Performed by: PLASTIC SURGERY

## 2025-03-05 PROCEDURE — 1160F RVW MEDS BY RX/DR IN RCRD: CPT | Performed by: PLASTIC SURGERY

## 2025-03-05 PROCEDURE — 99024 POSTOP FOLLOW-UP VISIT: CPT | Performed by: PLASTIC SURGERY

## 2025-03-05 NOTE — PROGRESS NOTES
[facility]     Follow-up Office Visit       Date: 03/05/2025   Patient Name: Megha Alcantara  MRN: [id number]  YOB: 1995    Chief Complaint:   Chief Complaint   Patient presents with    Post-op     2 week follow up--- S/P Right wrist arthroscopy w/ partial synovectomy---Right wrist dequervains release       History of Present Illness:   Megha Alcantara is a 29 y.o. female 2 weeks status post right wrist arthroscopy, debridement and de Quervain's release.  Is been doing well since surgery.  Reports pain is a 0 out of 10 at rest.  Only some pain with use or activity.  Denies numbness or tingling.      Subjective   Review of Systems:   Review of Systems   Constitutional: Negative.  Negative for chills, fatigue and fever.   HENT: Negative.  Negative for congestion and dental problem.    Eyes: Negative.  Negative for blurred vision.   Respiratory: Negative.  Negative for shortness of breath.    Cardiovascular: Negative.  Negative for leg swelling.   Gastrointestinal: Negative.  Negative for abdominal pain.   Endocrine: Negative.  Negative for polyuria.   Genitourinary: Negative.  Negative for difficulty urinating.   Musculoskeletal:  Positive for arthralgias.   Skin: Negative.    Allergic/Immunologic: Negative.    Neurological: Negative.    Hematological: Negative.  Negative for adenopathy.   Psychiatric/Behavioral: Negative.  Negative for behavioral problems.         Pertinent review of systems per HPI    I reviewed the patient's chief complaint, history of present illness, review of systems, past medical history, surgical history, family history, social history, medications and allergy list in the EMR on 03/05/2025 and agree with the findings above.    Objective    Vital Signs: There were no vitals filed for this visit.  BMI: There is no height or weight on file to calculate BMI.     General Appearance: No acute  distress. Alert and oriented.     Chest:  Non-labored breathing on room air      Ortho Exam:  Right wrist incisions clean dry intact and healing appropriately.  Able to extend her thumb with minimal pain.  Full range of motion of the right wrist.  Fingers warm and well-perfused distally  Sensation intact to light touch in the median, radial and ulnar nerve distributions    Imaging/Studies:   Imaging Results (Last 24 Hours)       ** No results found for the last 24 hours. **                Procedures:  Procedures    Quality Measures:   ACP:   ACP discussion was deferred.    Tobacco:   Megha Alcantara  reports that she has never smoked. She has never used smokeless tobacco.    Assessment / Plan    Assessment/Plan:      Diagnosis Plan   1. Post-operative state            2-week status post right wrist arthroscopy, synovectomy and right de Quervain's release.  She is doing well postoperatively.  Recommend hand therapy on active and passive range of motion of the right wrist and hand.  Recommend patient remain out of work for an additional 6 weeks.  Recommend follow-up in 6 weeks for repeat check.    Follow Up:   Return in about 6 weeks (around 4/16/2025).        Daniel Gupta MD  [facility] Hand and Upper Extremity Surgeon

## 2025-03-06 ENCOUNTER — TELEPHONE (OUTPATIENT)
Dept: ORTHOPEDIC SURGERY | Facility: CLINIC | Age: 30
End: 2025-03-06
Payer: COMMERCIAL

## 2025-03-06 NOTE — TELEPHONE ENCOUNTER
Caller: SEFERINO    Relationship: NURSE CASE MANGER    Best call back number: [phone]    What form or medical record are you requesting: OFFICE VISIT NOTES FROM 3-5-25 WORK STATUS AND ANY ORDERS    Who is requesting this form or medical record from you: WORK[employer]ENS[employer]     How would you like to receive the form or medical records (pick-up, mail, fax): FAX  If fax, what is the fax number: [phone]

## 2025-03-06 NOTE — TELEPHONE ENCOUNTER
Can you please send office notes and a work note for patient to remain off work for an additional 6 weeks from 3/5/25.  Thank you.  Susie

## 2025-03-07 ENCOUNTER — TELEPHONE (OUTPATIENT)
Dept: ORTHOPEDIC SURGERY | Facility: CLINIC | Age: 30
End: 2025-03-07
Payer: COMMERCIAL

## 2025-03-07 NOTE — TELEPHONE ENCOUNTER
Patient was sitting her cat down and her claw kind of reopened her stitch a bit. There is no blood or pain, but she just wanted to make sure that everything Is ok still.     Please advise.

## 2025-03-07 NOTE — TELEPHONE ENCOUNTER
Phone call to pt.  She has already had stitches taken out.  The cat scratched where a stitch was at the top of the steri strips.  There was a little blood when it happened but it has stopped bleeding and scabbed over now.  Just advised pt to keep an eye on it and watch for infection.  She said she think it is ok she just wanted to let us know.

## 2025-03-12 ENCOUNTER — TREATMENT (OUTPATIENT)
Dept: PHYSICAL THERAPY | Facility: CLINIC | Age: 30
End: 2025-03-12
Payer: OTHER MISCELLANEOUS

## 2025-03-12 DIAGNOSIS — M25.531 RIGHT WRIST PAIN: Primary | ICD-10-CM

## 2025-03-12 DIAGNOSIS — Z98.890 STATUS POST DE QUERVAIN RELEASE SURGERY: ICD-10-CM

## 2025-03-12 DIAGNOSIS — M65.939: ICD-10-CM

## 2025-03-12 NOTE — PROGRESS NOTES
Physical Therapy Initial Evaluation and Plan of Care  [facility] Physical Therapy [facility]  [address], [address]  [place] [address]    Patient: Megha Alcantara   : 1995  Diagnosis/ICD-10 Code:  Right wrist pain [M25.531]  Referring practitioner: Daniel Gupta MD  Today's Date: 3/12/2025    Subjective Evaluation    History of Present Illness  Mechanism of injury: Moving boxes at work ([employer]) last  ()    Subjective comment: I hurt my wrist and had surgery on   Patient Occupation:  at [employer]  At best pain ratin  At worst pain ratin  Quality: discomfort  Relieving factors: relaxation, rest and support  Aggravating factors: keyboarding, lifting, movement, outstretched reach and repetitive movement    Social Support  Lives with: young children    Treatments  Previous treatment: physical therapy       Objective          Observations     Right Wrist/Hand   Positive for edema and incision.       Palpation     Right Tenderness of the extensor pollicis longus.     Tenderness     Right Wrist/Hand   Tenderness in the carpometacarpal joint.     Neurological Testing     Sensation     Wrist/Hand   Left   Intact: light touch    Right   Intact: light touch    Active Range of Motion     Left Wrist   Normal active range of motion    Right Wrist   Wrist flexion: 40 degrees   Wrist extension: 30 degrees   Radial deviation: 15 degrees   Ulnar deviation: 10 degrees     Strength/Myotome Testing     Left Wrist/Hand   Normal wrist strength    Right Wrist/Hand   Wrist extension: 4-  Wrist flexion: 4  Radial deviation: 4-  Ulnar deviation: 4-    Tests     Right Wrist/Hand   Positive Finkelstein's.     Additional Tests Details  XR WRIST 3+ VW RIGHT  Date of Exam: 2024 12:50 AM EDT   Findings:  There is a well-corticated ossicle at the distal ulnar styloid. There is no acute fracture or dislocation. No erosions. Soft tissues are unremarkable. Joint spaces  appear intact.  Kirk Perez MD             Assessment & Plan       Assessment  Impairments: abnormal or restricted ROM, activity intolerance, impaired physical strength, lacks appropriate home exercise program, pain with function and safety issue   Functional limitations: carrying objects, lifting, sleeping, pulling, pushing, uncomfortable because of pain and unable to perform repetitive tasks   Assessment details:     Pt is 28 y/o female [employer] worker presenting to PT with symptoms consistent with diagnoses above. She had DeQuervain's release performed on Feb 18 w/ MD Gupta. Wrist extension and Ulnar deviation are most affected motions. Incision appears to be healing appropriately w/ no undue redness/swelling/erythema. She does have moderate tenderness along incision and into EPL/EPB tendons that is reported to be relieved w/ iontophoresis, IFC and ice after IE today. We discussed restrictions including limited lifting, end range UD ane WE. HEP was also discussed to include wrist/hand/thumb AROM, WBAT activities, and modalities for pain control.    Pt exhibits the following limitations: decreased R wrist ROM, decreased R wrist strength, and pain with ADLs. Pt's signs and symptoms are consistent with referring diagnosis. Pt would benefit from additional skilled therapy in order to address the aforementioned problems and return to prior level of functioning with minimal functional limitations.     Constant pain up to my elbow and N/T in 1st 3 digits    Broke wrist in 7th grade fell 25 feet from tree      Prognosis: good    Goals  Plan Goals:   SHORT TERM GOALS:  4 weeks  1.  Patient to be compliant with HEP.  2.  Pt to report decreased pain in the (R) UE of < 4/10 at worst.  3.  Increased proximal, distal radioulnar and carpal mobility to allow for increased AROM wrist.    LONG TERM GOALS: 8 weeks  1.  DASH score of < 7% perceived disability.  2.  Pt to report decreased pain in the (R) UE of < 1/10 pain at worst with  all activities listed above.  3.  Increased (R) , wrist and elbow AROM to WNL to allow for driving, work and household tasks without restrictions.  4.  Pt to exhibit 5/5 of (R) wrist, elbow and shoulder strength in order to work at computer and resume daily household activities and including job requirements and recreational activities (yard work, exercise) without complaints of pain limiting function.      Plan  Therapy options: will be seen for skilled therapy services  Planned modality interventions: cryotherapy, electrical stimulation/[place]an stimulation, ultrasound, iontophoresis, TENS and thermotherapy (hydrocollator packs)  Planned therapy interventions: ADL retraining, body mechanics training, functional ROM exercises, home exercise program, manual therapy, neuromuscular re-education, soft tissue mobilization, strengthening, therapeutic activities and stretching  Frequency: 2x week  Duration in weeks: 8  Treatment plan discussed with: patient        Timed Codes:  Manual Therapy -      0     mins  05957  Therapeutic Exercise: -     15     mins  34344     Neuromuscular Mala -     0     mins  18322   Therapeutic Activity -      0     mins  37357     Ultrasound -                     _0_   mins  89717  Iontophoresis -                   10     mins  19346    Timed Treatment:      25   mins    Untimed Codes:  Evaluation -          15   mins  Electrical Stimulation -        15   mins  34963 ([id number])  Traction -                             0   mins  32211    Total Treatment:         55   mins    PT: Hugh Ndiaye PT     [place] License Number:  [id number]  Electronically signed by Hugh Ndiaye PT, 03/12/25, 3:37 PM EDT    Certification Period: 3/20/2025 thru 6/17/2025  I certify that the therapy services are furnished while this patient is under my care.  The services outlined above are required by this patient, and will be reviewed every 90 days.         Physician  Signature:__________________________________________________    PHYSICIAN: Daniel Gupta MD      DATE:     Please sign and return via fax to [email or web address] . Thank you, [facility].    PT SIGNATURE: Hugh Ndiaye PT   [place] LICENSE: [id number]

## 2025-03-13 ENCOUNTER — TELEPHONE (OUTPATIENT)
Dept: ORTHOPEDIC SURGERY | Facility: CLINIC | Age: 30
End: 2025-03-13
Payer: COMMERCIAL

## 2025-03-13 NOTE — TELEPHONE ENCOUNTER
PT on [facility] is needing this patient's PT protocol. They saw her yesterday, but would like the protocol before her next appt on 3/19.    She is 3 weeks s/p right wrist arthroscopy w/ partial synovectomy and right wrist deQuervain's release.    Please fax protocol to [phone] attn: physical therapy.

## 2025-03-13 NOTE — TELEPHONE ENCOUNTER
Called faculty in regards to message about protocols for physical therapy for patient , I informed them that  would like the patient to WBAT, active and passive ROM w/ strengthening and scar massage . They understood and thanked me.    Tato CHAPMAN CMA

## 2025-03-19 ENCOUNTER — TREATMENT (OUTPATIENT)
Dept: PHYSICAL THERAPY | Facility: CLINIC | Age: 30
End: 2025-03-19
Payer: OTHER MISCELLANEOUS

## 2025-03-19 DIAGNOSIS — Z98.890 STATUS POST DE QUERVAIN RELEASE SURGERY: ICD-10-CM

## 2025-03-19 DIAGNOSIS — M25.531 RIGHT WRIST PAIN: Primary | ICD-10-CM

## 2025-03-19 NOTE — PROGRESS NOTES
"Physical Therapy Daily Treatment Note  [facility]   [address] [place] [address]  P: [phone] F: [phone]    Patient: Megha Alcantara   : 1995  Referring practitioner: Daniel Gupta MD  Date of Initial Visit: Type: THERAPY  Noted: 3/12/2025  Today's Date: 3/19/2025  Patient seen for 2 sessions       Visit Diagnoses:    ICD-10-CM ICD-9-CM   1. Right wrist pain  M25.531 719.43   2. Status post de Quervain release surgery  Z98.890 V45.89         Subjective:  Megha Alcantara reports:     Pt states She is doing ok today and had mod pain and soreness since previous session. Overall wrist/hand/thumb symptoms seem to be about the same since her initial evaluation last week.    Pt reported Pros - \"Ice and tens helps with the pain\"  Pt reported Cons - \"It still hurts to move it very much\"    Objective          Tests     Right Wrist/Hand   Positive Finkelstein's.         See Exercise, Manual, and Modality Logs for complete treatment.       Assessment:  Pt demonstrates no worsening of symptoms with regards to strength, ROM, endurance and functional activity tolerance based on subjective report and observation during exercise this date.        Plan:   Continue to monitor and progress ROM / strengthening / stabilization / functional activity as tolerated        Timed Codes:  Manual Therapy -    0   mins  03008;  Therapeutic Exercise: -   15   mins  75089;     Neuromuscular Mala -   0   mins  50024;    Therapeutic Activity -    15   mins  72753;     Ultrasound -                     0   mins  17876  Iontophoresis -                 15   mins  42409  _____________________________________  Timed Treatment -    45   mins      Untimed Codes:  Electrical Stimulation -   15  mins  81556 ([id number])  Traction -                0  mins  12322    Total Treatment Time:  60  mins       Hugh Ndiaye PT  [place] License #: [id number]    Physical Therapist      "

## 2025-03-26 ENCOUNTER — TREATMENT (OUTPATIENT)
Dept: PHYSICAL THERAPY | Facility: CLINIC | Age: 30
End: 2025-03-26
Payer: OTHER MISCELLANEOUS

## 2025-03-26 DIAGNOSIS — M25.531 RIGHT WRIST PAIN: Primary | ICD-10-CM

## 2025-03-26 DIAGNOSIS — M65.939: ICD-10-CM

## 2025-03-26 DIAGNOSIS — Z98.890 STATUS POST DE QUERVAIN RELEASE SURGERY: ICD-10-CM

## 2025-03-26 NOTE — PROGRESS NOTES
"Physical Therapy Daily Treatment Note  [facility]   [address] [place] [address]  P: [phone] F: [phone]    Patient: Megha Alcantara   : 1995  Referring practitioner: Daniel Gupta MD  Date of Initial Visit: Type: THERAPY  Noted: 3/12/2025  Today's Date: 3/26/2025  Patient seen for 3 sessions       Visit Diagnoses:    ICD-10-CM ICD-9-CM   1. Right wrist pain  M25.531 719.43   2. Status post de Quervain release surgery  Z98.890 V45.89   3. Synovitis and tenosynovitis of wrist  M65.939 727.00         Subjective:  Megha Alcantara reports:     Pt states She is doing ok today and had a typical amount of min/mod pain and soreness since previous session. Overall symptoms seem to be a little better with ExRx and pt is pleased with physical therapy progress so far.    Pt reported Pros - \"Not hurting as bad but still some pain here\" (along incision)  Pt reported Cons - \"Still hard to bend it this way\" (demonstrates wrist flexion and extension)    Objective          Active Range of Motion     Right Wrist   Wrist flexion: 45 degrees   Wrist extension: 45 degrees   Radial deviation: 20 degrees   Ulnar deviation: 15 degrees         See Exercise, Manual, and Modality Logs for complete treatment.       Assessment:  Pt demonstrates improvement with R wrist strength, ROM, endurance and functional activity tolerance based on subjective report and observation during exercise this date.     Pt's wrist mobility was much improved after manual mobilizations today and we discussed that it would be beneficial to increase the frequency of her visits to facilitate return of AROM.        Plan:   Continue to monitor and progress ROM / strengthening / stabilization / functional activity as tolerated        Timed Codes:  Manual Therapy -    15   mins  06937;  Therapeutic Exercise: -   15   mins  64748;     Neuromuscular Mala -   15   mins  12347;    Therapeutic " Activity -    15   mins  39937;     Ultrasound -                     0   mins  02810  Iontophoresis -                 0   mins  36295  _____________________________________  Timed Treatment -    60   mins      Untimed Codes:  Electrical Stimulation -   0  mins  58673 ([id number])  Traction -                0  mins  13150    Total Treatment Time:  60  mins       PAOLO Jessica License #: [id number]    Physical Therapist

## 2025-04-01 ENCOUNTER — TELEPHONE (OUTPATIENT)
Dept: ORTHOPEDIC SURGERY | Facility: CLINIC | Age: 30
End: 2025-04-01

## 2025-04-01 NOTE — TELEPHONE ENCOUNTER
[facility] received a fax that requires your attention. The document has been indexed to the patient’s chart for your review.      Reason for sending: RECEIVED FORMS FROM [facility] THAT NEED TO BE FILLED OUT BY THE PROVIDER    Documents Description: [employer]-3/31/2025    Name of Sender: NICOLE MANUEL    Date Indexed: 4/1/2025

## 2025-04-02 ENCOUNTER — TREATMENT (OUTPATIENT)
Dept: PHYSICAL THERAPY | Facility: CLINIC | Age: 30
End: 2025-04-02
Payer: OTHER MISCELLANEOUS

## 2025-04-02 DIAGNOSIS — M65.939: ICD-10-CM

## 2025-04-02 DIAGNOSIS — M25.531 RIGHT WRIST PAIN: Primary | ICD-10-CM

## 2025-04-02 DIAGNOSIS — Z98.890 STATUS POST DE QUERVAIN RELEASE SURGERY: ICD-10-CM

## 2025-04-02 NOTE — PROGRESS NOTES
"Physical Therapy Daily Treatment Note  [facility]   [address] [place] [address]  P: [phone] F: [phone]    Patient: Megha Alcantara   : 1995  Referring practitioner: Daniel Gupta MD  Date of Initial Visit: Type: THERAPY  Noted: 3/12/2025  Today's Date: 2025  Patient seen for 4 sessions       Visit Diagnoses:    ICD-10-CM ICD-9-CM   1. Right wrist pain  M25.531 719.43   2. Status post de Quervain release surgery  Z98.890 V45.89   3. Synovitis and tenosynovitis of wrist  M65.939 727.00         Subjective:  Megha Alcantara reports:     Pt states She is doing ok today and reports increased pain and soreness since previous session. Her wrist motion is a little better but she is a little concerned about the pain.    Pt reported Pros - \"Range is 10% better\"  Pt reported Cons - \"Pain is 20% worse and started about 3 days after last session\"     Objective     See Exercise, Manual, and Modality Logs for complete treatment.       Assessment:  Pt demonstrates improvement with wrist AROM, after stretching and manual mobilization. Her endurance and functional activity tolerance are also a little better but still apprehensive due to discomfort based on subjective report and observation during exercise this date.        Plan:   Continue to monitor and progress ROM / strengthening / stabilization / functional activity as tolerated        Timed Codes:  Manual Therapy -    15   mins  15114;  Therapeutic Exercise: -   15   mins  98409;     Neuromuscular Mala -   0   mins  72676;    Therapeutic Activity -    15   mins  42990;     Ultrasound -                     0   mins  52921  Iontophoresis -                 15   mins  76826  _____________________________________  Timed Treatment -    0   mins      Untimed Codes:  Electrical Stimulation -   0  mins  49715 ([id number])  Traction -                0  mins  42807    Total Treatment Time:  60  mins "       Hugh Ndiaye, PT  [place] License #: [id number]    Physical Therapist

## 2025-04-04 ENCOUNTER — TREATMENT (OUTPATIENT)
Dept: PHYSICAL THERAPY | Facility: CLINIC | Age: 30
End: 2025-04-04
Payer: OTHER MISCELLANEOUS

## 2025-04-04 DIAGNOSIS — M25.531 RIGHT WRIST PAIN: Primary | ICD-10-CM

## 2025-04-04 DIAGNOSIS — Z98.890 STATUS POST DE QUERVAIN RELEASE SURGERY: ICD-10-CM

## 2025-04-04 DIAGNOSIS — M65.939: ICD-10-CM

## 2025-04-04 NOTE — PROGRESS NOTES
Physical Therapy Daily Treatment Note  4/4/2025  Visit Diagnoses:    ICD-10-CM ICD-9-CM   1. Right wrist pain  M25.531 719.43   2. Status post de Quervain release surgery  Z98.890 V45.89   3. Synovitis and tenosynovitis of wrist  M65.939 727.00       VISIT#: 5      Megha Alcantara reports: She is getting more range of motion but it still hurts real bad. She is pretty stiff. She can do more without so  much pain. The ionto does not seem to help much.   surgery on Feb 18th   Current Pain Level:    0/10; Worst:   7/10 every time she uses it; Best:  0/10  Affected Area: Right wrist, Radial side of R distal forearm and wrist, thenar eminance  Quality of Pain: discomfort  Functional Deficits/Irritating Factors: keyboarding, lifting, movement, outstretched reach and repetitive movement   Progression: improving  Compliance with HEP Reported: Yes    Objective      Miguel Angel  Strength Test  R and L = 70 vs. 80   See Exercise, Manual, and Modality Logs for complete treatment.     Patient Education: Pt was educated on exercise biomechanical correctness, intensity, and speed.     Assessment:  Good improvement in range of motion in all planes. Decreasing pain and tolerance to functional activities.  Held off on exercises and focused on pain control and range of motion. Pt will continue to benefit from skilled PT interventions to address current functional deficits and impairments.     SHORT TERM GOALS:  4 weeks  1.  Patient to be compliant with HEP.  2.  Pt to report decreased pain in the (R) UE of < 4/10 at worst.  3.  Increased proximal, distal radioulnar and carpal mobility to allow for increased AROM wrist.     LONG TERM GOALS: 8 weeks  1.  DASH score of < 7% perceived disability.  2.  Pt to report decreased pain in the (R) UE of < 1/10 pain at worst with all activities listed above.  3.  Increased (R) , wrist and elbow AROM to WNL to allow for driving, work and household tasks without restrictions.  4.  Pt to exhibit 5/5  of (R) wrist, elbow and shoulder strength in order to work at computer and resume daily household activities and including job requirements and recreational activities (yard work, exercise) without complaints of pain limiting function.    Plan: Progress to/Continue with current program.       Timed:  Manual Therapy:            20_    mins  60052;  Ultrasound:                     10      mins  79524;   Therapeutic Exercise:    0     mins  96725;     Neuromuscular Mala:   0     mins  21420;    Therapeutic Activity:      0     mins  79578;      Iontophoresis              _0__   mins  Dry Needling               _0____   mins         Untimed:  Work Conditioning: __0__ mins 66103  Electrical Stimulation:    0    mins  63140 ( );  Mechanical Traction:       0        mins  27609;   Paraffin                       __0___  mins   Ice/Heat: __0__ mins      Timed Treatment:   30   mins   Total Treatment:     30   mins          Haven Doll PTA  [place] License # [id number]  Physical Therapist Assistant

## 2025-04-09 ENCOUNTER — TREATMENT (OUTPATIENT)
Dept: PHYSICAL THERAPY | Facility: CLINIC | Age: 30
End: 2025-04-09
Payer: OTHER MISCELLANEOUS

## 2025-04-09 DIAGNOSIS — M25.531 RIGHT WRIST PAIN: Primary | ICD-10-CM

## 2025-04-09 DIAGNOSIS — M65.939: ICD-10-CM

## 2025-04-09 DIAGNOSIS — Z98.890 STATUS POST DE QUERVAIN RELEASE SURGERY: ICD-10-CM

## 2025-04-15 NOTE — PROGRESS NOTES
Physical Therapy Daily Treatment Note  4/16/2025  Visit Diagnoses:    ICD-10-CM ICD-9-CM   1. Right wrist pain  M25.531 719.43   2. Status post de Quervain release surgery  Z98.890 V45.89   3. Synovitis and tenosynovitis of wrist  M65.939 727.00       VISIT#: 7      Megha Alcantara reports: She thinks she is 50% better. Her wrist is ok. It is still pretty stiff and is getting better slowly. She has a lot of pain in her extensor hallus longus.   Current Pain Level:    0/10; Worst:   7/10 every time she uses it; Best:  0/10  Affected Area: Right wrist, Radial side of R distal forearm and wrist, thenar eminance  Quality of Pain: discomfort  Functional Deficits/Irritating Factors: keyboarding, lifting, movement, outstretched reach and repetitive movement  Progression: improving slowly      Objective       Miguel Angel  Strength Test    R and L = 33.6# vs. 75     Tender at base of thumb and in thenar eminence.   Right Wrist   Wrist flexion: 30 degrees   Wrist extension: 60 degrees   Radial deviation: 15 degrees   Ulnar deviation: 20 degrees      Strength/Myotome Testing      Left Wrist/Hand   Normal wrist strength     Right Wrist/Hand   Wrist extension: 4  Wrist flexion: 4  Radial deviation: 4  Ulnar deviation: 5    Tests      Right Wrist/Hand   Positive Finkelstein's.    See Exercise, Manual, and Modality Logs for complete treatment.     Patient Education: Pt was educated on exercise biomechanical correctness, intensity, and speed.     Assessment:  Megha has had decreasing pain with increasing strength. Her tolerance to functional activities has improved but not to work demand levels or activities of daily living. I thinks she will benefit from additional PT for strengthening. Please contact me with any questions.         Plan: Progress to/Continue with current program. To MD      Timed:  Manual Therapy:            10_    mins  36742;  Ultrasound:                     10      mins  03608;   Therapeutic Exercise:    0      mins  74341;     Neuromuscular Mala:   0     mins  83624;    Therapeutic Activity:      30     mins  98216;      Iontophoresis              _0__   mins  Dry Needling               _0____   mins         Untimed:  Work Conditioning: __0__ mins 35942  Electrical Stimulation:    0    mins  38000 ( );  Mechanical Traction:       0        mins  56353;   Paraffin                       __0___  mins   Ice/Heat: __0__ mins      Timed Treatment:   50   mins   Total Treatment:     50   mins          Haven Doll PTA  [place] License # [id number]  Physical Therapist Assistant

## 2025-04-16 ENCOUNTER — TREATMENT (OUTPATIENT)
Dept: PHYSICAL THERAPY | Facility: CLINIC | Age: 30
End: 2025-04-16
Payer: OTHER MISCELLANEOUS

## 2025-04-16 ENCOUNTER — OFFICE VISIT (OUTPATIENT)
Dept: ORTHOPEDIC SURGERY | Facility: CLINIC | Age: 30
End: 2025-04-16
Payer: OTHER MISCELLANEOUS

## 2025-04-16 DIAGNOSIS — Z98.890 POST-OPERATIVE STATE: Primary | ICD-10-CM

## 2025-04-16 DIAGNOSIS — M65.939: ICD-10-CM

## 2025-04-16 DIAGNOSIS — Z98.890 STATUS POST DE QUERVAIN RELEASE SURGERY: ICD-10-CM

## 2025-04-16 DIAGNOSIS — M25.531 RIGHT WRIST PAIN: Primary | ICD-10-CM

## 2025-04-16 NOTE — PROGRESS NOTES
[facility] Orth[facility]     Follow-up Office Visit       Date: 04/16/2025   Patient Name: Megha Alcantara  MRN: [id number]  YOB: 1995    Chief Complaint:   Chief Complaint   Patient presents with    Post-op     6 wk f/u- S/P Right wrist arthroscopy w/ partial synovectomy---Right wrist dequervains release (DOS 2/15/25)       History of Present Illness:   Megha Alcantara is a 29 y.o. female now 8 weeks status post right wrist arthroscopy, debridement and de Quervain's release.  She has been doing better since her last visit.  Reports continues to have right wrist pain.  Pain is a 5 out of 10.  Is been working with therapy with some improvements.  No other concerns at this time.      Subjective   Review of Systems:   Review of Systems   Constitutional:  Negative for chills, fever, unexpected weight gain and unexpected weight loss.   HENT:  Negative for congestion, postnasal drip and rhinorrhea.    Eyes:  Negative for blurred vision.   Respiratory:  Negative for shortness of breath.    Cardiovascular:  Negative for leg swelling.   Gastrointestinal:  Negative for abdominal pain, nausea and vomiting.   Genitourinary:  Negative for difficulty urinating.   Musculoskeletal:  Positive for arthralgias. Negative for gait problem, joint swelling and myalgias.   Skin:  Negative for skin lesions and wound.   Neurological:  Negative for dizziness, weakness, light-headedness and numbness.   Hematological:  Does not bruise/bleed easily.   Psychiatric/Behavioral:  Negative for depressed mood.         Pertinent review of systems per HPI    I reviewed the patient's chief complaint, history of present illness, review of systems, past medical history, surgical history, family history, social history, medications and allergy list in the EMR on 04/16/2025 and agree with the findings above.    Objective    Vital Signs: There were no vitals filed for this  visit.  BMI: There is no height or weight on file to calculate BMI.     General Appearance: No acute distress. Alert and oriented.     Chest:  Non-labored breathing on room air      Ortho Exam:  Right wrist incision well-healed.  Nontender palpation over the right de Quervain's incision mildly tender palpation of the right dorsal radiocarpal joint.  Negative Leon shift test.  Fingers warm and well-perfused distally  Sensation intact to light touch in the median, radial and ulnar nerve distributions    Imaging/Studies:   Imaging Results (Last 24 Hours)       ** No results found for the last 24 hours. **                Procedures:  Procedures    Quality Measures:   ACP:   ACP discussion was deferred.    Tobacco:   Megha Alcantara  reports that she has never smoked. She has never used smokeless tobacco.    Assessment / Plan    Assessment/Plan:      Diagnosis Plan   1. Post-operative state            Patient presents for follow-up status post right wrist arthroscopy and right de Quervain's release, doing well postoperatively and advancing as expected.  Recommend patient continue physical therapy.  Recommend follow-up in 6 weeks for repeat check.    Follow Up:   Return in about 6 weeks (around 5/28/2025).        Daniel Gupta MD  [facility] Hand and Upper Extremity Surgeon

## 2025-04-18 ENCOUNTER — TREATMENT (OUTPATIENT)
Dept: PHYSICAL THERAPY | Facility: CLINIC | Age: 30
End: 2025-04-18
Payer: OTHER MISCELLANEOUS

## 2025-04-18 ENCOUNTER — TELEPHONE (OUTPATIENT)
Dept: ORTHOPEDIC SURGERY | Facility: CLINIC | Age: 30
End: 2025-04-18
Payer: COMMERCIAL

## 2025-04-18 DIAGNOSIS — M25.531 RIGHT WRIST PAIN: Primary | ICD-10-CM

## 2025-04-18 DIAGNOSIS — M65.939: ICD-10-CM

## 2025-04-18 DIAGNOSIS — Z98.890 STATUS POST DE QUERVAIN RELEASE SURGERY: ICD-10-CM

## 2025-04-18 NOTE — PROGRESS NOTES
"Physical Therapy Daily Treatment Note  [facility]   [address] [place] [address]  P: [phone] F: [phone]    Patient: Megha Alcantara   : 1995  Referring practitioner: Daniel Gupta MD  Date of Initial Visit: Type: THERAPY  Noted: 3/12/2025  Today's Date: 2025  Patient seen for 8 sessions       Visit Diagnoses:    ICD-10-CM ICD-9-CM   1. Right wrist pain  M25.531 719.43   2. Status post de Quervain release surgery  Z98.890 V45.89   3. Synovitis and tenosynovitis of wrist  M65.939 727.00         Subjective:  Megha Alcantara reports:     Pt states She is doing ok today and had moderate pain and soreness since previous session (up to 8/10). Overall symptoms seem to be a little better with ExRx and pt is pleased with physical therapy progress so far.    Pt reported Pros - \"stretching has been helping\"  Pt reported Cons - \"it still really hurts around my thumb\"    Objective     See Exercise, Manual, and Modality Logs for complete treatment.       Assessment:  Pt demonstrates improvement with wrist strength, ROM, endurance and functional activity tolerance based on subjective report and observation during exercise this date.        Plan:   Continue to monitor and progress ROM / strengthening / stabilization / functional activity as tolerated        Timed Codes:  Manual Therapy -    15   mins  00585;  Therapeutic Exercise: -   15   mins  04050;     Neuromuscular Mala -   0   mins  59313;    Therapeutic Activity -    15   mins  72523;     Ultrasound -                     0   mins  84498  Iontophoresis -                 0   mins  00190  _____________________________________  Timed Treatment -    45   mins      Untimed Codes:  Electrical Stimulation -   0  mins  38451 ([id number])  Traction -                0  mins  22724    Total Treatment Time:  45  mins       Hugh Ndiaye PT  [place] License #: [id number]    Physical Therapist      "

## 2025-04-21 NOTE — TELEPHONE ENCOUNTER
NEEDING A NEW ORDER PLACED FOR PHYSICAL THERAPY PLACED AND FAXED .[phone] - ALSO WANTED TO SEE IF THE [employer] DAY LETTER FAXED 03/31/2025 CAN BE FILLED OUT AND FAXED BACK

## 2025-04-22 DIAGNOSIS — M25.531 RIGHT WRIST PAIN: ICD-10-CM

## 2025-04-22 DIAGNOSIS — Z98.890 POST-OPERATIVE STATE: Primary | ICD-10-CM

## 2025-04-23 ENCOUNTER — TREATMENT (OUTPATIENT)
Dept: PHYSICAL THERAPY | Facility: CLINIC | Age: 30
End: 2025-04-23
Payer: OTHER MISCELLANEOUS

## 2025-04-23 DIAGNOSIS — Z98.890 STATUS POST DE QUERVAIN RELEASE SURGERY: ICD-10-CM

## 2025-04-23 DIAGNOSIS — M25.531 RIGHT WRIST PAIN: Primary | ICD-10-CM

## 2025-04-23 DIAGNOSIS — M65.939: ICD-10-CM

## 2025-04-23 NOTE — PROGRESS NOTES
"Physical Therapy Daily Treatment Note  [facility]   [address] [place] [address]  P: [phone] F: [phone]    Patient: Megha Alcantara   : 1995  Referring practitioner: Daniel Gupta MD  Date of Initial Visit: Type: THERAPY  Noted: 3/12/2025  Today's Date: 2025  Patient seen for 9 sessions       Visit Diagnoses:    ICD-10-CM ICD-9-CM   1. Right wrist pain  M25.531 719.43   2. Status post de Quervain release surgery  Z98.890 V45.89   3. Synovitis and tenosynovitis of wrist  M65.939 727.00         Subjective:  Megha Alcantara reports:     Pt states She is just ok today and had an increase in pain and soreness since previous session. Overall symptoms seem to be a little aggravated with ExRx and pt is pleased with physical therapy progress so far.    Pt reported Pros - \"Stretching is helpful and it's still moving better\"  Pt reported Cons - \"my hand hurts even when I'm not doing anything\"  Objective     See Exercise, Manual, and Modality Logs for complete treatment.       Assessment:  Pt demonstrates mild regression with pain mgmt and reports mild/mod pain and tingling at rest over the past week.  We had a low intensity session today focused on light stretching and modalities for pain control      Plan:   Continue to monitor and progress ROM / strengthening / stabilization / functional activity as tolerated        Timed Codes:  Manual Therapy -    15   mins  64955;  Therapeutic Exercise: -   15   mins  87104;     Neuromuscular Mala -   0   mins  43611;    Therapeutic Activity -    15   mins  05580;     Ultrasound -                     0   mins  43762  Iontophoresis -                 0   mins  60275  _____________________________________  Timed Treatment -    45   mins      Untimed Codes:  Electrical Stimulation -   0  mins  55673 ([id number])  Traction -                0  mins  93906    Total Treatment Time:  45  mins       Hugh" Senthil, PT  [place] License #: [id number]    Physical Therapist

## 2025-04-25 ENCOUNTER — TELEPHONE (OUTPATIENT)
Dept: PHYSICAL THERAPY | Facility: CLINIC | Age: 30
End: 2025-04-25

## 2025-04-25 NOTE — TELEPHONE ENCOUNTER
Caller: Megha Alcantara    Relationship: Self      What was the call regarding: CONFLICT WITH .

## 2025-04-28 NOTE — PROGRESS NOTES
Physical Therapy Daily Treatment Note  4/30/2025  Visit Diagnoses:    ICD-10-CM ICD-9-CM   1. Right wrist pain  M25.531 719.43   2. Status post de Quervain release surgery  Z98.890 V45.89   3. Synovitis and tenosynovitis of wrist  M65.939 727.00       VISIT#: 10      Megha Alcantara reports: Her wrist has been doing pretty good. She has not done much with it secondary to previous therapist suggesting she not do a lot with it because of pain. She is taking an anti-inflammatory and it has helped.  Current Pain Level:    0/10; Worst:   7/10 every time she uses it; Best:  0/10  Affected Area: Right wrist, Radial side of R distal forearm and wrist, thenar eminance  Quality of Pain: discomfort  Functional Deficits/Irritating Factors: keyboarding, lifting, movement, outstretched reach and repetitive movement  Progression: improving now      Objective      Miguel Angel  Strength Test     R and L = 33.6# vs. 75     See Exercise, Manual, and Modality Logs for complete treatment.     Patient Education: Pt was educated on exercise biomechanical correctness, intensity, and speed.     Assessment:  Megha continues to have difficulty with any resistive exercise involving the use of her thumb. She remains tender in the thenar eminence too. Low tolerance to many exercises.  Pt will continue to benefit from skilled PT interventions to address current functional deficits and impairments.       Plan: Progress to/Continue with current program.       Timed:  Manual Therapy:            8_    mins  23560;  Ultrasound:                     0      mins  31118;   Therapeutic Exercise:    10     mins  63808;     Neuromuscular Mala:   0     mins  80032;    Therapeutic Activity:      10     mins  68085;      Iontophoresis              _0__   mins  Dry Needling               _0____   mins         Untimed:  Work Conditioning: __0__ mins 02630  Electrical Stimulation:    0    mins  81299 ( );  Mechanical Traction:       0        mins   56666;   Paraffin                       __0___  mins   Ice/Heat: __10__ mins      Timed Treatment:   28   mins   Total Treatment:     38   mins          KOBE Amin License # [id number]  Physical Therapist Assistant

## 2025-04-30 ENCOUNTER — TREATMENT (OUTPATIENT)
Dept: PHYSICAL THERAPY | Facility: CLINIC | Age: 30
End: 2025-04-30
Payer: OTHER MISCELLANEOUS

## 2025-04-30 DIAGNOSIS — Z98.890 STATUS POST DE QUERVAIN RELEASE SURGERY: ICD-10-CM

## 2025-04-30 DIAGNOSIS — M65.939: ICD-10-CM

## 2025-04-30 DIAGNOSIS — M25.531 RIGHT WRIST PAIN: Primary | ICD-10-CM

## 2025-04-30 PROCEDURE — 97110 THERAPEUTIC EXERCISES: CPT | Performed by: PHYSICAL THERAPIST

## 2025-04-30 PROCEDURE — 97140 MANUAL THERAPY 1/> REGIONS: CPT | Performed by: PHYSICAL THERAPIST

## 2025-04-30 PROCEDURE — 97530 THERAPEUTIC ACTIVITIES: CPT | Performed by: PHYSICAL THERAPIST

## 2025-05-07 ENCOUNTER — TELEPHONE (OUTPATIENT)
Dept: PHYSICAL THERAPY | Facility: CLINIC | Age: 30
End: 2025-05-07

## 2025-05-07 NOTE — TELEPHONE ENCOUNTER
Caller: Megha Alcantara    Relationship: Self         What was the call regarding: JUST HAD CAR TOWED

## 2025-05-14 ENCOUNTER — TREATMENT (OUTPATIENT)
Dept: PHYSICAL THERAPY | Facility: CLINIC | Age: 30
End: 2025-05-14
Payer: OTHER MISCELLANEOUS

## 2025-05-14 DIAGNOSIS — Z98.890 STATUS POST DE QUERVAIN RELEASE SURGERY: ICD-10-CM

## 2025-05-14 DIAGNOSIS — M25.531 RIGHT WRIST PAIN: Primary | ICD-10-CM

## 2025-05-14 DIAGNOSIS — M65.939: ICD-10-CM

## 2025-05-14 NOTE — PROGRESS NOTES
"Physical Therapy Daily Treatment Note  [facility]   [address] [place] [address]  P: [phone] F: [phone]    Patient: Megha Alcantara   : 1995  Referring practitioner: Daniel Gupta MD  Date of Initial Visit: Type: THERAPY  Noted: 3/12/2025  Today's Date: 2025  Patient seen for 11 sessions       Visit Diagnoses:    ICD-10-CM ICD-9-CM   1. Right wrist pain  M25.531 719.43   2. Status post de Quervain release surgery  Z98.890 V45.89   3. Synovitis and tenosynovitis of wrist  M65.939 727.00         Subjective:  Megha Alcantara reports:     Pt states She is doing ok today and had a typical amount of pain and soreness since previous session. Overall symptoms seem to be a little better with ExRx and pt is pleased with physical therapy progress so far. Worst pain was 7/10 and only for 1 minute after she bumped her arm at home    Pt reported Pros - \"It doesn't hurt as bad since I started taking celebrex\"  Pt reported Cons - \"Exercise seem helpful but if I do too much it gets really sore\"    Objective     See Exercise, Manual, and Modality Logs for complete treatment.       Assessment:  Pt demonstrates improvement with R wrist/thumb strength, ROM, endurance and functional activity tolerance based on subjective report and observation during exercise this date. Pt brought her young son to therapy today and needed to have a short session to accommodate. She has upcoming appt w/ MD Gupta on .        Plan:   Continue to monitor and progress ROM / strengthening / stabilization / functional activity as tolerated        Timed Codes:  Manual Therapy -    0   mins  63948;  Therapeutic Exercise: -   15   mins  33740;     Neuromuscular Mala -   0   mins  75991;    Therapeutic Activity -    15   mins  39799;     Ultrasound -                     0   mins  36112  Iontophoresis -                 0   mins  " 67177  _____________________________________  Timed Treatment -    30   mins      Untimed Codes:  Electrical Stimulation -   0  mins  67529 ([id number])  Traction -                0  mins  93230    Total Treatment Time:  30  mins       PAOLO Jessica License #: [id number]    Physical Therapist

## 2025-05-28 ENCOUNTER — OFFICE VISIT (OUTPATIENT)
Dept: ORTHOPEDIC SURGERY | Facility: CLINIC | Age: 30
End: 2025-05-28
Payer: OTHER MISCELLANEOUS

## 2025-05-28 VITALS
BODY MASS INDEX: 21.09 KG/M2 | HEIGHT: 63 IN | WEIGHT: 119 LBS | SYSTOLIC BLOOD PRESSURE: 128 MMHG | DIASTOLIC BLOOD PRESSURE: 82 MMHG

## 2025-05-28 DIAGNOSIS — Z98.890 POST-OPERATIVE STATE: Primary | ICD-10-CM

## 2025-05-28 DIAGNOSIS — M25.531 RIGHT WRIST PAIN: ICD-10-CM

## 2025-05-28 NOTE — PROGRESS NOTES
"                                                                 [facility] Orthopedic     Follow-up Office Visit       Date: 05/28/2025   Patient Name: Megha Alcantara  MRN: [id number]  YOB: 1995    Chief Complaint:   Chief Complaint   Patient presents with    Follow-up     6 wk follow up -- 3.5 months S/P Right wrist arthroscopy w/ partial synovectomy---Right wrist dequervains release (DOS 2/15/25)           History of Present Illness:   Megha Alcantara is a 29 y.o. female months status post right wrist arthroscopy and right de Quervain's release.  She been doing well since her last visit.  Reports pain is slowly improving.  Reports pain is a 4 out of 10 at its worst.  She still continues to have right wrist pain with lifting heavy objects.  No other concerns at this time.      Subjective   Review of Systems:   Review of Systems   Constitutional: Negative.    HENT: Negative.     Eyes: Negative.    Respiratory: Negative.     Cardiovascular: Negative.    Gastrointestinal: Negative.    Endocrine: Negative.    Genitourinary: Negative.    Musculoskeletal:  Positive for arthralgias.   Skin: Negative.    Allergic/Immunologic: Negative.    Neurological: Negative.    Hematological: Negative.    Psychiatric/Behavioral: Negative.          Pertinent review of systems per HPI    I reviewed the patient's chief complaint, history of present illness, review of systems, past medical history, surgical history, family history, social history, medications and allergy list in the EMR on 05/28/2025 and agree with the findings above.    Objective    Vital Signs:   Vitals:    05/28/25 1303   BP: 128/82   Weight: 54 kg (119 lb)   Height: 160 cm (62.99\")     BMI: Body mass index is 21.09 kg/m².     General Appearance: No acute distress. Alert and oriented.     Chest:  Non-labored breathing on room air      Ortho Exam:  Wrist incisions are well-healed.  Minimally tender to palpation over the right first extensor " compartment.  Negative Finkelstein's test.  Mild tenderness to palpation over the right thumb CMC.  Negative CMC shuck test.  Negative grind test.  Nontender over the remainder of the right wrist.  Fingers warm and well-perfused distally  Sensation intact to light touch in the median, radial and ulnar nerve distributions    Imaging/Studies:   Imaging Results (Last 24 Hours)       ** No results found for the last 24 hours. **                Procedures:  Procedures    Quality Measures:   ACP:   ACP discussion was deferred.    Tobacco:   Megha Alcantara  reports that she has never smoked. She has never used smokeless tobacco.    Assessment / Plan    Assessment/Plan:      Diagnosis Plan   1. Post-operative state        2. Right wrist pain            Presents during month status post right wrist arthroscopy, debridement and de Quervain's release.  She been doing well since her last visit.  Pain continues to improve however she still does have pain and discomfort with lifting heavy objects.  Recommend continue current work restrictions of no lifting greater than 10 pounds.  Recommend transition to home exercise program.  Recommend follow-up in 6.    Follow Up:   Return in about 6 weeks (around 7/9/2025).        Daniel Gupta MD  [facility] Hand and Upper Extremity Surgeon

## 2025-07-24 ENCOUNTER — OFFICE VISIT (OUTPATIENT)
Age: 30
End: 2025-07-24
Payer: OTHER MISCELLANEOUS

## 2025-07-24 VITALS
BODY MASS INDEX: 20.02 KG/M2 | WEIGHT: 113 LBS | HEIGHT: 63 IN | SYSTOLIC BLOOD PRESSURE: 128 MMHG | DIASTOLIC BLOOD PRESSURE: 96 MMHG

## 2025-07-24 DIAGNOSIS — M25.531 RIGHT WRIST PAIN: Primary | ICD-10-CM

## 2025-07-24 RX ORDER — METHYLPREDNISOLONE 4 MG/1
1 TABLET ORAL DAILY
Qty: 21 TABLET | Refills: 0 | Status: SHIPPED | OUTPATIENT
Start: 2025-07-24

## 2025-07-24 RX ORDER — VALACYCLOVIR HYDROCHLORIDE 500 MG/1
500 TABLET, FILM COATED ORAL DAILY
COMMUNITY
Start: 2025-07-02
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
## 2025-08-14 ENCOUNTER — TELEPHONE (OUTPATIENT)
Dept: ORTHOPEDIC SURGERY | Facility: CLINIC | Age: 30
End: 2025-08-14
Payer: COMMERCIAL

## (undated) DEVICE — TUBING, SUCTION, 1/4" X 20', STRAIGHT: Brand: MEDLINE INDUSTRIES, INC.

## (undated) DEVICE — LOU D & C HYSTEROSCOPY: Brand: MEDLINE INDUSTRIES, INC.

## (undated) DEVICE — PENCL E/S HNDSWCH ROCKR CB

## (undated) DEVICE — STRAP STIRUP WO/ RNG

## (undated) DEVICE — NDL HYPO PRECISIONGLIDE REG 25G 1 1/2

## (undated) DEVICE — GLV SURG BIOGEL LTX PF 7 1/2

## (undated) DEVICE — INTENDED FOR TISSUE SEPARATION, AND OTHER PROCEDURES THAT REQUIRE A SHARP SURGICAL BLADE TO PUNCTURE OR CUT.: Brand: BARD-PARKER ® CARBON RIB-BACK BLADES

## (undated) DEVICE — PAD SANI MAXI W/ADHS SNG WRP 11IN

## (undated) DEVICE — MEDI-VAC YANKAUER SUCTION HANDLE W/BULBOUS TIP: Brand: CARDINAL HEALTH

## (undated) DEVICE — SYR CONTRL LUERLOK 10CC

## (undated) DEVICE — GLV SURG TRIUMPH CLASSIC PF LTX 7 STRL